# Patient Record
Sex: MALE | Race: WHITE | NOT HISPANIC OR LATINO | Employment: STUDENT | URBAN - METROPOLITAN AREA
[De-identification: names, ages, dates, MRNs, and addresses within clinical notes are randomized per-mention and may not be internally consistent; named-entity substitution may affect disease eponyms.]

---
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
## 2024-03-11 ENCOUNTER — OFFICE VISIT (OUTPATIENT)
Dept: FAMILY MEDICINE CLINIC | Facility: CLINIC | Age: 24
End: 2024-03-11
Payer: COMMERCIAL

## 2024-03-11 VITALS
OXYGEN SATURATION: 98 % | DIASTOLIC BLOOD PRESSURE: 70 MMHG | WEIGHT: 161 LBS | HEART RATE: 70 BPM | BODY MASS INDEX: 21.81 KG/M2 | TEMPERATURE: 97.7 F | SYSTOLIC BLOOD PRESSURE: 118 MMHG | HEIGHT: 72 IN

## 2024-03-11 DIAGNOSIS — R53.83 OTHER FATIGUE: ICD-10-CM

## 2024-03-11 DIAGNOSIS — Z00.00 ANNUAL PHYSICAL EXAM: Primary | ICD-10-CM

## 2024-03-11 DIAGNOSIS — K92.9 DIGESTIVE DISORDER: ICD-10-CM

## 2024-03-11 DIAGNOSIS — Z76.89 ENCOUNTER TO ESTABLISH CARE: ICD-10-CM

## 2024-03-11 PROCEDURE — 99385 PREV VISIT NEW AGE 18-39: CPT | Performed by: NURSE PRACTITIONER

## 2024-03-11 NOTE — PROGRESS NOTES
ADULT ANNUAL PHYSICAL  [facility] MEDICINE [facility]    NAME: Jeff Abebe  AGE: 23 y.o. SEX: male  : 2000     DATE: 3/11/2024     Assessment and Plan:     Problem List Items Addressed This Visit    None  Visit Diagnoses     Annual physical exam    -  Primary    Encounter to establish care        Other fatigue        Relevant Orders    Lipid panel    Vitamin B12    Vitamin D 25 hydroxy    Comprehensive metabolic panel    TSH, 3rd generation with Free T4 reflex    CBC and differential    Hemoglobin A1C    Digestive disorder        Relevant Orders    Celiac Disease Antibody Profile        Immunizations and preventive care screenings were discussed with patient today. Appropriate education was printed on patient's after visit summary.    Counseling:  Alcohol/drug use: discussed moderation in alcohol intake, the recommendations for healthy alcohol use, and avoidance of illicit drug use.  Dental Health: discussed importance of regular tooth brushing, flossing, and dental visits.  Exercise: the importance of regular exercise/physical activity was discussed. Recommend exercise 3-5 times per week for at least 30 minutes.     Depression Screening and Follow-up Plan: Patient was screened for depression during today's encounter. They screened negative with a PHQ-2 score of 0.      Return in about 1 year (around 3/11/2025) for Annual physical.or sooner if needed     Chief Complaint:     Chief Complaint   Patient presents with   • Establish Care   • Physical Exam     Feeling fatigue      History of Present Illness:     Adult Annual Physical   Patient here to establish care and due for a comprehensive physical exam. He reports that he has not been seen by a primary provider for a number of years. His girlfriend is the driving force to get him to establish today. The patient reports that he has been feeling extremely fatigued for awhile. He works swing shift so he understands  that might plan a part in his fatigue. He reports that he sleeps but still is tired throughout the day. His girlfriend does report he snores but no witnessed apnea episodes noted. He also reports having some bowel symptoms that sounds like a possible IBS-D. He does have a history of acid reflux but it is not bad to mange. He reports thinking he has some type of arrhythmia because at times he gets these feels like his heart skips a beat but girlfriend thinks it might have an anxiety component.       Diet and Physical Activity  Diet/Nutrition: well balanced diet, limited junk food, and consuming 3-5 servings of fruits/vegetables daily.   Exercise: moderate cardiovascular exercise and 5-7 times a week on average.      Depression Screening  PHQ-2/9 Depression Screening    Little interest or pleasure in doing things: 0 - not at all  Feeling down, depressed, or hopeless: 0 - not at all  PHQ-2 Score: 0  PHQ-2 Interpretation: Negative depression screen       General Health  Sleep: sleeps well and gets 7-8 hours of sleep on average.   Hearing: normal - bilateral.  Vision: no vision problems.   Dental: no dental visits for >1 year and brushes teeth twice daily.        Health  History of STDs?: no.    Advanced Care Planning  Do you have an advanced directive? no  Do you have a durable medical power of ? no  ACP document given to the patient? no     Review of Systems:     Review of Systems   Constitutional:  Positive for fatigue. Negative for chills, fever and unexpected weight change.   HENT: Negative.     Respiratory: Negative.     Cardiovascular:  Positive for palpitations (occasionally). Negative for chest pain and leg swelling.   Gastrointestinal:  Positive for diarrhea (occasionally). Negative for abdominal distention, abdominal pain, nausea and vomiting.   Musculoskeletal: Negative.    Skin: Negative.    Neurological: Negative.  Negative for dizziness, light-headedness and headaches.   Psychiatric/Behavioral:  Negative.        Past Medical History:     History reviewed. No pertinent past medical history.   Past Surgical History:     History reviewed. No pertinent surgical history.   Social History:     Social History     Socioeconomic History   • Marital status: Single     Spouse name: None   • Number of children: None   • Years of education: None   • Highest education level: None   Occupational History   • None   Tobacco Use   • Smoking status: Never     Passive exposure: Past   • Smokeless tobacco: Never   Vaping Use   • Vaping status: None   Substance and Sexual Activity   • Alcohol use: Never   • Drug use: Never   • Sexual activity: Yes   Other Topics Concern   • None   Social History Narrative   • None     Social Determinants of Health     Financial Resource Strain: Not on file   Food Insecurity: Not on file   Transportation Needs: Not on file   Physical Activity: Not on file   Stress: Not on file   Social Connections: Not on file   Intimate Partner Violence: Not on file   Housing Stability: Not on file      Family History:     Family History   Problem Relation Age of Onset   • Diabetes Father       Current Medications:     No current outpatient medications on file.     No current facility-administered medications for this visit.      Allergies:     Allergies   Allergen Reactions   • Penicillins Hives     swelling      Physical Exam:     /70 (BP Location: Left arm, Patient Position: Sitting, Cuff Size: Large)   Pulse 70   Temp 97.7 °F (36.5 °C)   Ht 6' (1.829 m)   Wt 73 kg (161 lb)   SpO2 98%   BMI 21.84 kg/m² (Reviewed)    Physical Exam  Vitals reviewed.   Constitutional:       General: He is not in acute distress.     Appearance: He is well-developed and well-groomed. He is not ill-appearing.   HENT:      Head: Normocephalic and atraumatic.      Right Ear: External ear normal.      Left Ear: External ear normal.      Nose: Nose normal.      Mouth/Throat:      Lips: Pink.      Mouth: Mucous membranes are  moist.      Dentition: Normal dentition.      Pharynx: Oropharynx is clear.   Eyes:      General: Lids are normal.      Extraocular Movements: Extraocular movements intact.      Conjunctiva/sclera: Conjunctivae normal.      Pupils: Pupils are equal, round, and reactive to light.   Neck:      Thyroid: No thyromegaly or thyroid tenderness.      Trachea: Trachea and phonation normal.   Cardiovascular:      Rate and Rhythm: Normal rate and regular rhythm.      Pulses:           Radial pulses are 2+ on the right side and 2+ on the left side.        Dorsalis pedis pulses are 2+ on the right side and 2+ on the left side.        Posterior tibial pulses are 2+ on the right side and 2+ on the left side.      Heart sounds: Normal heart sounds. No murmur heard.  Pulmonary:      Effort: Pulmonary effort is normal.      Breath sounds: Normal breath sounds.   Abdominal:      General: Abdomen is flat. Bowel sounds are normal. There is no distension.      Palpations: Abdomen is soft.      Tenderness: There is no abdominal tenderness.   Musculoskeletal:      Cervical back: Neck supple.      Right lower leg: No edema.      Left lower leg: No edema.   Skin:     General: Skin is warm and dry.      Capillary Refill: Capillary refill takes less than 2 seconds.      Nails: There is no clubbing.   Neurological:      General: No focal deficit present.      Mental Status: He is alert and oriented to person, place, and time.      Cranial Nerves: Cranial nerves 2-12 are intact.      Deep Tendon Reflexes: Reflexes are normal and symmetric.   Psychiatric:         Mood and Affect: Mood normal.         Speech: Speech normal.         Behavior: Behavior normal. Behavior is cooperative.          CAROL ANN Youssef   [facility]

## 2024-03-12 ENCOUNTER — APPOINTMENT (OUTPATIENT)
Dept: LAB | Facility: CLINIC | Age: 24
End: 2024-03-12
Payer: COMMERCIAL

## 2024-03-12 DIAGNOSIS — K92.9 DIGESTIVE DISORDER: ICD-10-CM

## 2024-03-12 DIAGNOSIS — R53.83 OTHER FATIGUE: ICD-10-CM

## 2024-03-12 LAB
25(OH)D3 SERPL-MCNC: 20.5 NG/ML (ref 30–100)
ALBUMIN SERPL BCP-MCNC: 4.9 G/DL (ref 3.5–5)
ALP SERPL-CCNC: 46 U/L (ref 34–104)
ALT SERPL W P-5'-P-CCNC: 44 U/L (ref 7–52)
ANION GAP SERPL CALCULATED.3IONS-SCNC: 9 MMOL/L (ref 4–13)
AST SERPL W P-5'-P-CCNC: 29 U/L (ref 13–39)
BASOPHILS # BLD AUTO: 0.05 THOUSANDS/ÂΜL (ref 0–0.1)
BASOPHILS NFR BLD AUTO: 1 % (ref 0–1)
BILIRUB SERPL-MCNC: 0.82 MG/DL (ref 0.2–1)
BUN SERPL-MCNC: 15 MG/DL (ref 5–25)
CALCIUM SERPL-MCNC: 9.7 MG/DL (ref 8.4–10.2)
CHLORIDE SERPL-SCNC: 102 MMOL/L (ref 96–108)
CHOLEST SERPL-MCNC: 154 MG/DL
CO2 SERPL-SCNC: 30 MMOL/L (ref 21–32)
CREAT SERPL-MCNC: 1.21 MG/DL (ref 0.6–1.3)
EOSINOPHIL # BLD AUTO: 0.12 THOUSAND/ÂΜL (ref 0–0.61)
EOSINOPHIL NFR BLD AUTO: 2 % (ref 0–6)
ERYTHROCYTE [DISTWIDTH] IN BLOOD BY AUTOMATED COUNT: 12.1 % (ref 11.6–15.1)
EST. AVERAGE GLUCOSE BLD GHB EST-MCNC: 108 MG/DL
GFR SERPL CREATININE-BSD FRML MDRD: 83 ML/MIN/1.73SQ M
GLUCOSE P FAST SERPL-MCNC: 97 MG/DL (ref 65–99)
HBA1C MFR BLD: 5.4 %
HCT VFR BLD AUTO: 51.9 % (ref 36.5–49.3)
HDLC SERPL-MCNC: 49 MG/DL
HGB BLD-MCNC: 17.3 G/DL (ref 12–17)
IMM GRANULOCYTES # BLD AUTO: 0.02 THOUSAND/UL (ref 0–0.2)
IMM GRANULOCYTES NFR BLD AUTO: 0 % (ref 0–2)
LDLC SERPL CALC-MCNC: 84 MG/DL (ref 0–100)
LYMPHOCYTES # BLD AUTO: 1.93 THOUSANDS/ÂΜL (ref 0.6–4.47)
LYMPHOCYTES NFR BLD AUTO: 30 % (ref 14–44)
MCH RBC QN AUTO: 29.2 PG (ref 26.8–34.3)
MCHC RBC AUTO-ENTMCNC: 33.3 G/DL (ref 31.4–37.4)
MCV RBC AUTO: 88 FL (ref 82–98)
MONOCYTES # BLD AUTO: 0.56 THOUSAND/ÂΜL (ref 0.17–1.22)
MONOCYTES NFR BLD AUTO: 9 % (ref 4–12)
NEUTROPHILS # BLD AUTO: 3.85 THOUSANDS/ÂΜL (ref 1.85–7.62)
NEUTS SEG NFR BLD AUTO: 58 % (ref 43–75)
NONHDLC SERPL-MCNC: 105 MG/DL
NRBC BLD AUTO-RTO: 0 /100 WBCS
PLATELET # BLD AUTO: 226 THOUSANDS/UL (ref 149–390)
PMV BLD AUTO: 11.5 FL (ref 8.9–12.7)
POTASSIUM SERPL-SCNC: 4.2 MMOL/L (ref 3.5–5.3)
PROT SERPL-MCNC: 7.2 G/DL (ref 6.4–8.4)
RBC # BLD AUTO: 5.92 MILLION/UL (ref 3.88–5.62)
SODIUM SERPL-SCNC: 141 MMOL/L (ref 135–147)
TRIGL SERPL-MCNC: 107 MG/DL
TSH SERPL DL<=0.05 MIU/L-ACNC: 1.09 UIU/ML (ref 0.45–4.5)
VIT B12 SERPL-MCNC: 475 PG/ML (ref 180–914)
WBC # BLD AUTO: 6.53 THOUSAND/UL (ref 4.31–10.16)

## 2024-03-12 PROCEDURE — 80061 LIPID PANEL: CPT

## 2024-03-12 PROCEDURE — 80053 COMPREHEN METABOLIC PANEL: CPT

## 2024-03-12 PROCEDURE — 84443 ASSAY THYROID STIM HORMONE: CPT

## 2024-03-12 PROCEDURE — 82607 VITAMIN B-12: CPT

## 2024-03-12 PROCEDURE — 82306 VITAMIN D 25 HYDROXY: CPT

## 2024-03-12 PROCEDURE — 83036 HEMOGLOBIN GLYCOSYLATED A1C: CPT

## 2024-03-12 PROCEDURE — 86364 TISS TRNSGLTMNASE EA IG CLAS: CPT

## 2024-03-12 PROCEDURE — 86258 DGP ANTIBODY EACH IG CLASS: CPT

## 2024-03-12 PROCEDURE — 82784 ASSAY IGA/IGD/IGG/IGM EACH: CPT

## 2024-03-12 PROCEDURE — 36415 COLL VENOUS BLD VENIPUNCTURE: CPT

## 2024-03-12 PROCEDURE — 86231 EMA EACH IG CLASS: CPT

## 2024-03-12 PROCEDURE — 85025 COMPLETE CBC W/AUTO DIFF WBC: CPT

## 2024-03-14 LAB
ENDOMYSIUM IGA SER QL: NEGATIVE
GLIADIN PEPTIDE IGA SER-ACNC: 4 UNITS (ref 0–19)
GLIADIN PEPTIDE IGG SER-ACNC: 3 UNITS (ref 0–19)
IGA SERPL-MCNC: 165 MG/DL (ref 90–386)
TTG IGA SER-ACNC: <2 U/ML (ref 0–3)
TTG IGG SER-ACNC: <2 U/ML (ref 0–5)

## 2024-05-06 DIAGNOSIS — K21.9 CHRONIC GERD: Primary | ICD-10-CM

## 2024-09-17 ENCOUNTER — CONSULT (OUTPATIENT)
Dept: GASTROENTEROLOGY | Facility: AMBULARY SURGERY CENTER | Age: 24
End: 2024-09-17
Payer: COMMERCIAL

## 2024-09-17 ENCOUNTER — PREP FOR PROCEDURE (OUTPATIENT)
Dept: GASTROENTEROLOGY | Facility: AMBULARY SURGERY CENTER | Age: 24
End: 2024-09-17

## 2024-09-17 VITALS
WEIGHT: 166 LBS | SYSTOLIC BLOOD PRESSURE: 118 MMHG | DIASTOLIC BLOOD PRESSURE: 84 MMHG | BODY MASS INDEX: 22.48 KG/M2 | HEIGHT: 72 IN | OXYGEN SATURATION: 99 % | HEART RATE: 71 BPM

## 2024-09-17 DIAGNOSIS — K21.9 CHRONIC GERD: ICD-10-CM

## 2024-09-17 DIAGNOSIS — R19.7 DIARRHEA, UNSPECIFIED TYPE: ICD-10-CM

## 2024-09-17 DIAGNOSIS — K21.9 GASTROESOPHAGEAL REFLUX DISEASE WITHOUT ESOPHAGITIS: Primary | ICD-10-CM

## 2024-09-17 DIAGNOSIS — R10.30 LOWER ABDOMINAL PAIN: ICD-10-CM

## 2024-09-17 PROCEDURE — 99204 OFFICE O/P NEW MOD 45 MIN: CPT | Performed by: INTERNAL MEDICINE

## 2024-09-17 RX ORDER — DICYCLOMINE HCL 20 MG
20 TABLET ORAL 3 TIMES DAILY PRN
Qty: 30 TABLET | Refills: 1 | Status: SHIPPED | OUTPATIENT
Start: 2024-09-17

## 2024-09-17 RX ORDER — SODIUM PICOSULFATE, MAGNESIUM OXIDE, AND ANHYDROUS CITRIC ACID 12; 3.5; 1 G/175ML; G/175ML; MG/175ML
LIQUID ORAL
Qty: 350 ML | Refills: 0 | Status: SHIPPED | OUTPATIENT
Start: 2024-09-17

## 2024-09-17 NOTE — ASSESSMENT & PLAN NOTE
Patient complaining about acid reflux symptoms and noticed some improvement on Prilosec.    -Patient was explained about the lifestyle and dietary modifications.  Advised to avoid fatty foods, chocolates, caffeine, alcohol and any other triggering foods.  Avoid eating for at least 3 hours before going to bed.    -May take Pepcid or Tums as needed    -Schedule EGD along with colonoscopy

## 2024-09-17 NOTE — ASSESSMENT & PLAN NOTE
Postprandial diarrhea associated with abdominal pain appears to be most likely functional secondary to IBS.  Probable lactose intolerance.  Doubt for IBD    -Explained to patient in detail about different possible etiologies and given reassurance.    -Discussed about the pathophysiology and the treatment plan for IBS    -Schedule for colonoscopy.    -Patient was given instructions about the colonoscopy prep.    -Patient was explained about the risks and benefits of the procedure. Risks including but not limited to bleeding, infection, perforation were explained in detail. Also explained about less than 100% sensitivity with the exam and other alternatives.

## 2024-09-17 NOTE — PROGRESS NOTES
Consultation -  [facility]  Jeffgoldie Gibsonold 2000 male         ASSESSMENT & PLAN:    Diarrhea  Postprandial diarrhea associated with abdominal pain appears to be most likely functional secondary to IBS.  Probable lactose intolerance.  Doubt for IBD    -Explained to patient in detail about different possible etiologies and given reassurance.    -Discussed about the pathophysiology and the treatment plan for IBS    -Schedule for colonoscopy.    -Patient was given instructions about the colonoscopy prep.    -Patient was explained about the risks and benefits of the procedure. Risks including but not limited to bleeding, infection, perforation were explained in detail. Also explained about less than 100% sensitivity with the exam and other alternatives.    Lower abdominal pain  Appears to be small bowel spasms    -Treatment plan as described above    -Will try Bentyl as needed    Gastroesophageal reflux disease without esophagitis  Patient complaining about acid reflux symptoms and noticed some improvement on Prilosec.    -Patient was explained about the lifestyle and dietary modifications.  Advised to avoid fatty foods, chocolates, caffeine, alcohol and any other triggering foods.  Avoid eating for at least 3 hours before going to bed.    -May take Pepcid or Tums as needed    -Schedule EGD along with colonoscopy      Chief Complaint: Abdominal pain, diarrhea, GERD    HPI: 24-year-old male with no significant past medical history reports having chronic stomach issues.  Describes as pain after eating associated with loose bowel movements.  Denies any blood Demix in the stool.  Good appetite, no recent weight loss.  Occasional acid reflux and he recently took 2-week course of Prilosec with help.  Denies any difficulty swallowing.  He was tested negative for celiac disease    Chaperon: Ms. Navarro    REVIEW OF SYSTEMS: Review of Systems   Constitutional:  Negative for activity change, appetite change,  chills, diaphoresis, fatigue, fever and unexpected weight change.   HENT:  Negative for ear discharge, ear pain, facial swelling, hearing loss, nosebleeds, sore throat, tinnitus and voice change.    Eyes:  Negative for pain, discharge, redness, itching and visual disturbance.   Respiratory:  Negative for apnea, cough, chest tightness, shortness of breath and wheezing.    Cardiovascular:  Negative for chest pain and palpitations.   Gastrointestinal:         As noted in HPI   Endocrine: Negative for cold intolerance, heat intolerance and polyuria.   Genitourinary:  Negative for difficulty urinating, dysuria, flank pain, hematuria and urgency.   Musculoskeletal:  Negative for arthralgias, back pain, gait problem, joint swelling and myalgias.   Skin:  Negative for rash and wound.   Neurological:  Negative for dizziness, tremors, seizures, speech difficulty, light-headedness, numbness and headaches.   Hematological:  Negative for adenopathy. Does not bruise/bleed easily.   Psychiatric/Behavioral:  Negative for agitation, behavioral problems and confusion. The patient is not nervous/anxious.         History reviewed. No pertinent past medical history.   Past Surgical History:   Procedure Laterality Date    SHOULDER SURGERY Left 04/14/2024     Social History     Socioeconomic History    Marital status: Single     Spouse name: Not on file    Number of children: Not on file    Years of education: Not on file    Highest education level: Not on file   Occupational History    Not on file   Tobacco Use    Smoking status: Never     Passive exposure: Past    Smokeless tobacco: Never   Vaping Use    Vaping status: Not on file   Substance and Sexual Activity    Alcohol use: Never    Drug use: Never    Sexual activity: Yes   Other Topics Concern    Not on file   Social History Narrative    Not on file     Social Determinants of Health     Financial Resource Strain: Not on file   Food Insecurity: Not on file   Transportation Needs:  Not on file   Physical Activity: Not on file   Stress: Not on file   Social Connections: Not on file   Intimate Partner Violence: Not on file   Housing Stability: Not on file     Family History   Problem Relation Age of Onset    Diabetes Father      Penicillins  Current Outpatient Medications   Medication Sig Dispense Refill    dicyclomine (BENTYL) 20 mg tablet Take 1 tablet (20 mg total) by mouth 3 (three) times a day as needed (Abdominal pain) 30 tablet 1    sodium picosulfate, magnesium oxide, citric acid (Clenpiq) oral solution Take 175 mL (1 bottle) the evening before the colonoscopy, between 5 PM and 9 PM, followed by a second 175 mL bottle 5 hours before the colonoscopy. 350 mL 0     No current facility-administered medications for this visit.       Blood pressure 118/84, pulse 71, height 6' (1.829 m), weight 75.3 kg (166 lb), SpO2 99%.    PHYSICAL EXAM: Physical Exam  Constitutional:       Appearance: He is well-developed.   HENT:      Head: Normocephalic and atraumatic.   Eyes:      General: No scleral icterus.        Right eye: No discharge.         Left eye: No discharge.      Conjunctiva/sclera: Conjunctivae normal.      Pupils: Pupils are equal, round, and reactive to light.   Neck:      Thyroid: No thyromegaly.      Vascular: No JVD.      Trachea: No tracheal deviation.   Cardiovascular:      Rate and Rhythm: Normal rate and regular rhythm.      Heart sounds: Normal heart sounds. No murmur heard.     No friction rub. No gallop.   Pulmonary:      Effort: Pulmonary effort is normal. No respiratory distress.      Breath sounds: Normal breath sounds. No wheezing or rales.   Chest:      Chest wall: No tenderness.   Abdominal:      General: Bowel sounds are normal. There is no distension.      Palpations: Abdomen is soft. There is no mass.      Tenderness: There is no abdominal tenderness. There is no guarding or rebound.      Hernia: No hernia is present.   Musculoskeletal:      Cervical back: Neck supple.  "  Lymphadenopathy:      Cervical: No cervical adenopathy.   Skin:     General: Skin is warm and dry.      Findings: No erythema or rash.   Neurological:      Mental Status: He is alert and oriented to person, place, and time.   Psychiatric:         Behavior: Behavior normal.         Thought Content: Thought content normal.          Lab Results   Component Value Date    WBC 6.53 03/12/2024    HGB 17.3 (H) 03/12/2024    HCT 51.9 (H) 03/12/2024    MCV 88 03/12/2024     03/12/2024     Lab Results   Component Value Date    CALCIUM 9.7 03/12/2024    K 4.2 03/12/2024    CO2 30 03/12/2024     03/12/2024    BUN 15 03/12/2024    CREATININE 1.21 03/12/2024     Lab Results   Component Value Date    ALT 44 03/12/2024    AST 29 03/12/2024    ALKPHOS 46 03/12/2024     No results found for: \"INR\", \"PROTIME\"    Patient was never admitted.        "

## 2024-09-17 NOTE — ASSESSMENT & PLAN NOTE
Appears to be small bowel spasms    -Treatment plan as described above    -Will try Bentyl as needed

## 2024-10-20 ENCOUNTER — HOSPITAL ENCOUNTER (OUTPATIENT)
Facility: HOSPITAL | Age: 24
Discharge: HOME | End: 2024-10-21
Attending: EMERGENCY MEDICINE | Admitting: STUDENT IN AN ORGANIZED HEALTH CARE EDUCATION/TRAINING PROGRAM
Payer: COMMERCIAL

## 2024-10-20 ENCOUNTER — APPOINTMENT (EMERGENCY)
Dept: CT IMAGING | Facility: HOSPITAL | Age: 24
End: 2024-10-20
Payer: COMMERCIAL

## 2024-10-20 ENCOUNTER — ANESTHESIA EVENT (EMERGENCY)
Dept: PERIOP | Facility: HOSPITAL | Age: 24
End: 2024-10-20
Payer: COMMERCIAL

## 2024-10-20 ENCOUNTER — ANESTHESIA (EMERGENCY)
Dept: PERIOP | Facility: HOSPITAL | Age: 24
End: 2024-10-20
Payer: COMMERCIAL

## 2024-10-20 DIAGNOSIS — K35.30 ACUTE APPENDICITIS WITH LOCALIZED PERITONITIS, WITHOUT PERFORATION, ABSCESS, OR GANGRENE: Primary | ICD-10-CM

## 2024-10-20 LAB
ANION GAP SERPL CALCULATED.3IONS-SCNC: 11 MMOL/L (ref 4–13)
BASOPHILS # BLD AUTO: 0.04 THOUSANDS/ΜL (ref 0–0.1)
BASOPHILS NFR BLD AUTO: 0 % (ref 0–1)
BILIRUB UR QL STRIP: NEGATIVE
BUN SERPL-MCNC: 12 MG/DL (ref 5–25)
CALCIUM SERPL-MCNC: 9.9 MG/DL (ref 8.4–10.2)
CHLORIDE SERPL-SCNC: 101 MMOL/L (ref 96–108)
CLARITY UR: CLEAR
CO2 SERPL-SCNC: 24 MMOL/L (ref 21–32)
COLOR UR: ABNORMAL
CREAT SERPL-MCNC: 1.18 MG/DL (ref 0.6–1.3)
EOSINOPHIL # BLD AUTO: 0.14 THOUSAND/ΜL (ref 0–0.61)
EOSINOPHIL NFR BLD AUTO: 1 % (ref 0–6)
ERYTHROCYTE [DISTWIDTH] IN BLOOD BY AUTOMATED COUNT: 11.9 % (ref 11.6–15.1)
GLUCOSE SERPL-MCNC: 104 MG/DL (ref 65–140)
GLUCOSE UR STRIP-MCNC: NEGATIVE MG/DL
HCT VFR BLD AUTO: 50.3 % (ref 36.5–46.1)
HGB BLD-MCNC: 17.6 G/DL (ref 12–15.4)
HGB UR QL STRIP.AUTO: NEGATIVE
IMM GRANULOCYTES # BLD AUTO: 0.04 THOUSAND/UL (ref 0–0.2)
IMM GRANULOCYTES NFR BLD AUTO: 0 % (ref 0–2)
KETONES UR STRIP-MCNC: ABNORMAL MG/DL
LEUKOCYTE ESTERASE UR QL STRIP: NEGATIVE
LYMPHOCYTES # BLD AUTO: 1.06 THOUSANDS/ΜL (ref 0.6–4.47)
LYMPHOCYTES NFR BLD AUTO: 8 % (ref 14–44)
MCH RBC QN AUTO: 29.1 PG (ref 26.8–34.3)
MCHC RBC AUTO-ENTMCNC: 35 G/DL (ref 31.4–37.4)
MCV RBC AUTO: 83 FL (ref 82–98)
MONOCYTES # BLD AUTO: 1.03 THOUSAND/ΜL (ref 0.17–1.22)
MONOCYTES NFR BLD AUTO: 8 % (ref 4–12)
NEUTROPHILS # BLD AUTO: 10.56 THOUSANDS/ΜL (ref 1.85–7.62)
NEUTS SEG NFR BLD AUTO: 83 % (ref 43–75)
NITRITE UR QL STRIP: NEGATIVE
NRBC BLD AUTO-RTO: 0 /100 WBCS
PH UR STRIP.AUTO: 7 [PH]
PLATELET # BLD AUTO: 235 THOUSANDS/UL (ref 149–390)
PMV BLD AUTO: 10.9 FL (ref 8.9–12.7)
POTASSIUM SERPL-SCNC: 3.4 MMOL/L (ref 3.5–5.3)
PROT UR STRIP-MCNC: NEGATIVE MG/DL
RBC # BLD AUTO: 6.05 MILLION/UL (ref 3.88–5.12)
SODIUM SERPL-SCNC: 136 MMOL/L (ref 135–147)
SP GR UR STRIP.AUTO: 1.02 (ref 1–1.03)
UROBILINOGEN UR STRIP-ACNC: <2 MG/DL
WBC # BLD AUTO: 12.87 THOUSAND/UL (ref 4.31–10.16)

## 2024-10-20 PROCEDURE — 80048 BASIC METABOLIC PNL TOTAL CA: CPT | Performed by: EMERGENCY MEDICINE

## 2024-10-20 PROCEDURE — 74177 CT ABD & PELVIS W/CONTRAST: CPT

## 2024-10-20 PROCEDURE — 96375 TX/PRO/DX INJ NEW DRUG ADDON: CPT

## 2024-10-20 PROCEDURE — 96366 THER/PROPH/DIAG IV INF ADDON: CPT

## 2024-10-20 PROCEDURE — 44970 LAPAROSCOPY APPENDECTOMY: CPT | Performed by: STUDENT IN AN ORGANIZED HEALTH CARE EDUCATION/TRAINING PROGRAM

## 2024-10-20 PROCEDURE — 99284 EMERGENCY DEPT VISIT MOD MDM: CPT

## 2024-10-20 PROCEDURE — 85025 COMPLETE CBC W/AUTO DIFF WBC: CPT | Performed by: EMERGENCY MEDICINE

## 2024-10-20 PROCEDURE — 81003 URINALYSIS AUTO W/O SCOPE: CPT | Performed by: EMERGENCY MEDICINE

## 2024-10-20 PROCEDURE — 99285 EMERGENCY DEPT VISIT HI MDM: CPT | Performed by: EMERGENCY MEDICINE

## 2024-10-20 PROCEDURE — 99205 OFFICE O/P NEW HI 60 MIN: CPT | Performed by: STUDENT IN AN ORGANIZED HEALTH CARE EDUCATION/TRAINING PROGRAM

## 2024-10-20 PROCEDURE — 88304 TISSUE EXAM BY PATHOLOGIST: CPT | Performed by: SPECIALIST

## 2024-10-20 PROCEDURE — 96365 THER/PROPH/DIAG IV INF INIT: CPT

## 2024-10-20 PROCEDURE — 36415 COLL VENOUS BLD VENIPUNCTURE: CPT | Performed by: EMERGENCY MEDICINE

## 2024-10-20 RX ORDER — MAGNESIUM HYDROXIDE 1200 MG/15ML
LIQUID ORAL AS NEEDED
Status: DISCONTINUED | OUTPATIENT
Start: 2024-10-20 | End: 2024-10-20 | Stop reason: HOSPADM

## 2024-10-20 RX ORDER — ROCURONIUM BROMIDE 10 MG/ML
INJECTION, SOLUTION INTRAVENOUS AS NEEDED
Status: DISCONTINUED | OUTPATIENT
Start: 2024-10-20 | End: 2024-10-20

## 2024-10-20 RX ORDER — ONDANSETRON 2 MG/ML
INJECTION INTRAMUSCULAR; INTRAVENOUS AS NEEDED
Status: DISCONTINUED | OUTPATIENT
Start: 2024-10-20 | End: 2024-10-20

## 2024-10-20 RX ORDER — ACETAMINOPHEN 325 MG/1
975 TABLET ORAL EVERY 6 HOURS
Status: DISCONTINUED | OUTPATIENT
Start: 2024-10-20 | End: 2024-10-21 | Stop reason: HOSPADM

## 2024-10-20 RX ORDER — GLYCOPYRROLATE 0.2 MG/ML
INJECTION INTRAMUSCULAR; INTRAVENOUS AS NEEDED
Status: DISCONTINUED | OUTPATIENT
Start: 2024-10-20 | End: 2024-10-20

## 2024-10-20 RX ORDER — MIDAZOLAM HYDROCHLORIDE 2 MG/2ML
INJECTION, SOLUTION INTRAMUSCULAR; INTRAVENOUS AS NEEDED
Status: DISCONTINUED | OUTPATIENT
Start: 2024-10-20 | End: 2024-10-20

## 2024-10-20 RX ORDER — METRONIDAZOLE 500 MG/100ML
INJECTION, SOLUTION INTRAVENOUS CONTINUOUS PRN
Status: DISCONTINUED | OUTPATIENT
Start: 2024-10-20 | End: 2024-10-20

## 2024-10-20 RX ORDER — HYDROMORPHONE HCL IN WATER/PF 6 MG/30 ML
0.2 PATIENT CONTROLLED ANALGESIA SYRINGE INTRAVENOUS
Status: DISCONTINUED | OUTPATIENT
Start: 2024-10-20 | End: 2024-10-21 | Stop reason: HOSPADM

## 2024-10-20 RX ORDER — SODIUM CHLORIDE, SODIUM LACTATE, POTASSIUM CHLORIDE, CALCIUM CHLORIDE 600; 310; 30; 20 MG/100ML; MG/100ML; MG/100ML; MG/100ML
75 INJECTION, SOLUTION INTRAVENOUS CONTINUOUS
Status: DISCONTINUED | OUTPATIENT
Start: 2024-10-20 | End: 2024-10-21

## 2024-10-20 RX ORDER — OXYCODONE HYDROCHLORIDE 5 MG/1
5 TABLET ORAL EVERY 4 HOURS PRN
Status: DISCONTINUED | OUTPATIENT
Start: 2024-10-20 | End: 2024-10-21 | Stop reason: HOSPADM

## 2024-10-20 RX ORDER — HYDROMORPHONE HCL/PF 1 MG/ML
0.25 SYRINGE (ML) INJECTION
Status: DISCONTINUED | OUTPATIENT
Start: 2024-10-20 | End: 2024-10-20 | Stop reason: HOSPADM

## 2024-10-20 RX ORDER — FENTANYL CITRATE/PF 50 MCG/ML
25 SYRINGE (ML) INJECTION
Status: DISCONTINUED | OUTPATIENT
Start: 2024-10-20 | End: 2024-10-20 | Stop reason: HOSPADM

## 2024-10-20 RX ORDER — LIDOCAINE HYDROCHLORIDE 10 MG/ML
INJECTION, SOLUTION EPIDURAL; INFILTRATION; INTRACAUDAL; PERINEURAL AS NEEDED
Status: DISCONTINUED | OUTPATIENT
Start: 2024-10-20 | End: 2024-10-20 | Stop reason: HOSPADM

## 2024-10-20 RX ORDER — SODIUM CHLORIDE, SODIUM LACTATE, POTASSIUM CHLORIDE, CALCIUM CHLORIDE 600; 310; 30; 20 MG/100ML; MG/100ML; MG/100ML; MG/100ML
INJECTION, SOLUTION INTRAVENOUS CONTINUOUS PRN
Status: DISCONTINUED | OUTPATIENT
Start: 2024-10-20 | End: 2024-10-20

## 2024-10-20 RX ORDER — ONDANSETRON 2 MG/ML
4 INJECTION INTRAMUSCULAR; INTRAVENOUS EVERY 4 HOURS PRN
Status: DISCONTINUED | OUTPATIENT
Start: 2024-10-20 | End: 2024-10-21 | Stop reason: HOSPADM

## 2024-10-20 RX ORDER — CEFAZOLIN SODIUM 2 G/50ML
2000 SOLUTION INTRAVENOUS EVERY 8 HOURS
Status: DISCONTINUED | OUTPATIENT
Start: 2024-10-20 | End: 2024-10-21 | Stop reason: HOSPADM

## 2024-10-20 RX ORDER — ONDANSETRON 2 MG/ML
4 INJECTION INTRAMUSCULAR; INTRAVENOUS ONCE AS NEEDED
Status: DISCONTINUED | OUTPATIENT
Start: 2024-10-20 | End: 2024-10-20 | Stop reason: HOSPADM

## 2024-10-20 RX ORDER — PROMETHAZINE HYDROCHLORIDE 25 MG/ML
12.5 INJECTION, SOLUTION INTRAMUSCULAR; INTRAVENOUS ONCE AS NEEDED
Status: DISCONTINUED | OUTPATIENT
Start: 2024-10-20 | End: 2024-10-20 | Stop reason: HOSPADM

## 2024-10-20 RX ORDER — ACETAMINOPHEN 10 MG/ML
1000 INJECTION, SOLUTION INTRAVENOUS EVERY 6 HOURS SCHEDULED
Status: DISCONTINUED | OUTPATIENT
Start: 2024-10-20 | End: 2024-10-20

## 2024-10-20 RX ORDER — PROPOFOL 10 MG/ML
INJECTION, EMULSION INTRAVENOUS CONTINUOUS PRN
Status: DISCONTINUED | OUTPATIENT
Start: 2024-10-20 | End: 2024-10-20

## 2024-10-20 RX ORDER — PROPOFOL 10 MG/ML
INJECTION, EMULSION INTRAVENOUS AS NEEDED
Status: DISCONTINUED | OUTPATIENT
Start: 2024-10-20 | End: 2024-10-20

## 2024-10-20 RX ORDER — FENTANYL CITRATE 50 UG/ML
INJECTION, SOLUTION INTRAMUSCULAR; INTRAVENOUS AS NEEDED
Status: DISCONTINUED | OUTPATIENT
Start: 2024-10-20 | End: 2024-10-20

## 2024-10-20 RX ORDER — MEPERIDINE HYDROCHLORIDE 25 MG/ML
12.5 INJECTION INTRAMUSCULAR; INTRAVENOUS; SUBCUTANEOUS
Status: DISCONTINUED | OUTPATIENT
Start: 2024-10-20 | End: 2024-10-20 | Stop reason: HOSPADM

## 2024-10-20 RX ORDER — LIDOCAINE HYDROCHLORIDE 10 MG/ML
INJECTION, SOLUTION EPIDURAL; INFILTRATION; INTRACAUDAL; PERINEURAL AS NEEDED
Status: DISCONTINUED | OUTPATIENT
Start: 2024-10-20 | End: 2024-10-20

## 2024-10-20 RX ORDER — HYDROMORPHONE HCL/PF 1 MG/ML
0.5 SYRINGE (ML) INJECTION ONCE
Status: COMPLETED | OUTPATIENT
Start: 2024-10-20 | End: 2024-10-20

## 2024-10-20 RX ORDER — HEPARIN SODIUM 5000 [USP'U]/ML
5000 INJECTION, SOLUTION INTRAVENOUS; SUBCUTANEOUS EVERY 8 HOURS SCHEDULED
Status: DISCONTINUED | OUTPATIENT
Start: 2024-10-20 | End: 2024-10-20

## 2024-10-20 RX ORDER — METRONIDAZOLE 500 MG/100ML
500 INJECTION, SOLUTION INTRAVENOUS EVERY 8 HOURS
Status: DISCONTINUED | OUTPATIENT
Start: 2024-10-20 | End: 2024-10-21 | Stop reason: HOSPADM

## 2024-10-20 RX ADMIN — FENTANYL CITRATE 100 MCG: 50 INJECTION INTRAMUSCULAR; INTRAVENOUS at 16:05

## 2024-10-20 RX ADMIN — METRONIDAZOLE: 500 INJECTION, SOLUTION INTRAVENOUS at 16:11

## 2024-10-20 RX ADMIN — HYDROMORPHONE HYDROCHLORIDE 0.5 MG: 1 INJECTION, SOLUTION INTRAMUSCULAR; INTRAVENOUS; SUBCUTANEOUS at 13:04

## 2024-10-20 RX ADMIN — CEFAZOLIN SODIUM 2000 MG: 2 SOLUTION INTRAVENOUS at 15:56

## 2024-10-20 RX ADMIN — MIDAZOLAM 2 MG: 1 INJECTION INTRAMUSCULAR; INTRAVENOUS at 16:02

## 2024-10-20 RX ADMIN — PROPOFOL 50 MG: 10 INJECTION, EMULSION INTRAVENOUS at 17:19

## 2024-10-20 RX ADMIN — HYDROMORPHONE HYDROCHLORIDE 0.2 MG: 0.2 INJECTION, SOLUTION INTRAMUSCULAR; INTRAVENOUS; SUBCUTANEOUS at 15:37

## 2024-10-20 RX ADMIN — ROCURONIUM BROMIDE 5 MG: 10 INJECTION INTRAVENOUS at 16:05

## 2024-10-20 RX ADMIN — PROPOFOL 200 MG: 10 INJECTION, EMULSION INTRAVENOUS at 16:05

## 2024-10-20 RX ADMIN — SODIUM CHLORIDE, SODIUM LACTATE, POTASSIUM CHLORIDE, AND CALCIUM CHLORIDE: .6; .31; .03; .02 INJECTION, SOLUTION INTRAVENOUS at 16:02

## 2024-10-20 RX ADMIN — ACETAMINOPHEN 975 MG: 325 TABLET ORAL at 19:02

## 2024-10-20 RX ADMIN — SODIUM CHLORIDE, SODIUM LACTATE, POTASSIUM CHLORIDE, AND CALCIUM CHLORIDE 75 ML/HR: .6; .31; .03; .02 INJECTION, SOLUTION INTRAVENOUS at 19:23

## 2024-10-20 RX ADMIN — IOHEXOL 75 ML: 350 INJECTION, SOLUTION INTRAVENOUS at 13:14

## 2024-10-20 RX ADMIN — LIDOCAINE HYDROCHLORIDE 50 MG: 10 INJECTION, SOLUTION EPIDURAL; INFILTRATION; INTRACAUDAL at 16:05

## 2024-10-20 RX ADMIN — PROPOFOL 150 MCG/KG/MIN: 10 INJECTION, EMULSION INTRAVENOUS at 16:33

## 2024-10-20 RX ADMIN — ACETAMINOPHEN 1000 MG: 10 INJECTION INTRAVENOUS at 17:45

## 2024-10-20 RX ADMIN — METRONIDAZOLE 500 MG: 500 INJECTION, SOLUTION INTRAVENOUS at 22:36

## 2024-10-20 RX ADMIN — SUGAMMADEX 400 MG: 100 INJECTION, SOLUTION INTRAVENOUS at 17:19

## 2024-10-20 RX ADMIN — OXYCODONE HYDROCHLORIDE 5 MG: 5 TABLET ORAL at 20:50

## 2024-10-20 RX ADMIN — ONDANSETRON 4 MG: 2 INJECTION INTRAMUSCULAR; INTRAVENOUS at 16:02

## 2024-10-20 RX ADMIN — ONDANSETRON 4 MG: 2 INJECTION INTRAMUSCULAR; INTRAVENOUS at 20:51

## 2024-10-20 RX ADMIN — GLYCOPYRROLATE 0.2 MG: 0.2 INJECTION INTRAMUSCULAR; INTRAVENOUS at 16:02

## 2024-10-20 RX ADMIN — HYDROMORPHONE HYDROCHLORIDE 0.2 MG: 0.2 INJECTION, SOLUTION INTRAMUSCULAR; INTRAVENOUS; SUBCUTANEOUS at 22:40

## 2024-10-20 RX ADMIN — SODIUM CHLORIDE, SODIUM LACTATE, POTASSIUM CHLORIDE, AND CALCIUM CHLORIDE 500 ML: .6; .31; .03; .02 INJECTION, SOLUTION INTRAVENOUS at 13:04

## 2024-10-20 NOTE — OP NOTE
OPERATIVE REPORT  PATIENT NAME: Jeff Abebe    :  2000  MRN: [id number]  Pt Location: AN OR ROOM 03    SURGERY DATE: 10/20/2024    Surgeons and Role:     * Lauryn Ullrich, DO - Primary     * Janis Joaqiun MD - Assisting     * Ivan Ann MD - Assisting    Preop Diagnosis:  Acute appendicitis with localized peritonitis, without perforation, abscess, or gangrene [K35.30]    Post-Op Diagnosis Codes:     * Acute appendicitis with localized peritonitis, without perforation, abscess, or gangrene [K35.30]    Procedure(s):  APPENDECTOMY LAPAROSCOPIC    Specimen(s):  ID Type Source Tests Collected by Time Destination   1 :  Tissue Appendix TISSUE EXAM Lauryn Ullrich, DO 10/20/2024 1648        Estimated Blood Loss:   Minimal    Drains: none  [REMOVED] Urethral Catheter Latex 16 Fr. (Removed)   Number of days: 0       Anesthesia Type:   General    Operative Indications:  Acute appendicitis with localized peritonitis, without perforation, abscess, or gangrene [K35.30]      Operative Findings:  Hyperemic inflamed appendix consistent with acute appendicitis      Complications:   None    Procedure and Technique:  The patient was brought to the operating room and identified verbally and via wristband. He was transferred to the operating room table and positioned supine. General endotracheal anesthesia was induced successfully and a Young catheter was placed. The patient's abdomen was prepped and draped in the usual sterile fashion. Pre-operative antibiotics were administered. A time out was performed confirming the patient, procedure, and site.  All parties were in agreement.    An infraumbilical vertical incision was made.  The fascia was elevated and a Veress needle was inserted.  Proper position was confirmed by aspiration and saline meniscus test.  A 5 mm trocar was inserted under Optiview.  The abdomen was insufflated with carbon dioxide to a pressure of 12 to 15 mmHg.  The patient tolerated insufflation well.   The laparoscope was inserted and the abdomen inspected.  No injuries from initial trocar placement were noted.  Under direct visualization a 12 mm trocar was inserted under direct visualization in the left lower quadrant lateral to the rectus muscle.  Care was taken to avoid injury to the bladder or inferior epigastric vessels.  A 5 mm port was then placed in the suprapubic region under direct visualization.  The table was placed in the Trendelenburg position with the right side elevated.  The appendix was gently grasped with an endoscopic grasper and retracted upwards and toward the pelvis.  It was noted to be inflamed and hyperemic.  A window was developed in the mesoappendix at a point between the base of the appendix and the cecum.  An endoscopic linear cutting stapler was then used to divide and staple the base of the appendix.  It was reloaded with a vascular cartridge and the mesoappendix similarly divided.  It was noted that at the lateral aspect of the staple line on the mesoappendix there was a slow ooze.  Laparoscopic clip applier was utilized and 1 clip was placed which resulted in hemostasis.  The appendiceal stump was then irrigated and hemostasis was assured.  Fluid was suctioned and no other pathology was identified.  Secondary trocars were removed under direct visualization.  No bleeding was noted at the trocar sites.  The laparoscope was withdrawn and the umbilical trocar removed.  The abdomen was allowed to collapse.      Utilizing a suture passer, the fascia of the 12 mm port was closed with an 0 Vicryl suture.  Skin of all port sites was closed utilizing 4-0 Monocryl suture in a subcuticular fashion.  Exofin glue was utilized over the port sites.     The patient was then allowed to awaken, extubated, and transferred to the PACU having tolerated the procedure well. All instrument, needle, and sponge counts were correct at the end of the case. Radiofrequency detection was negative.     Dr. Ullrich  was present for the entire procedure.    Patient Disposition:  PACU       SIGNATURE: Ivan Ann MD  DATE: October 20, 2024  TIME: 5:43 PM

## 2024-10-20 NOTE — ANESTHESIA POSTPROCEDURE EVALUATION
Post-Op Assessment Note    CV Status:  Stable  Pain Score: 0    Pain management: adequate       Mental Status:  Alert and awake   Hydration Status:  Euvolemic   PONV Controlled:  Controlled   Airway Patency:  Patent  Airway: intubated  Two or more mitigation strategies used for obstructive sleep apnea   Post Op Vitals Reviewed: Yes    No anethesia notable event occurred.    Staff: CRNA           Last Filed PACU Vitals:  Vitals Value Taken Time   Temp 97.9    Pulse 102    /79    Resp 15    SpO2 100        Modified Jaswant:  Activity: 2 (10/20/2024  5:30 PM)  Respiration: 2 (10/20/2024  5:30 PM)  Circulation: 2 (10/20/2024  5:30 PM)  Consciousness: 1 (10/20/2024  5:30 PM)  Oxygen Saturation: 1 (10/20/2024  5:30 PM)  Modified Jaswant Score: 8 (10/20/2024  5:30 PM)

## 2024-10-20 NOTE — H&P
H&P - Surgery-General   Name: Jeff Abebe 24 y.o. adult I MRN: [id number]  Unit/Bed#: ED-05 I Date of Admission: 10/20/2024   Date of Service: 10/20/2024 I Hospital Day: 0     Assessment & Plan  Acute appendicitis with localized peritonitis  Patient is a 24-year-old male who presents with 1 day of right lower quadrant pain.  CT scan demonstrated acute appendicitis with appendicolith.  He is currently hemodynamically stable.    -Will plan for laparoscopic appendectomy today  -N.p.o. for OR  -Will start antibiotics with Rocephin and Flagyl  -Pain and nausea control as needed    History of Present Illness   Jeff Abebe is a 24 y.o. adult with no significant past medical history who presents with right lower quadrant pain since 6 PM last evening.  He reports having nausea but no vomiting.  His last meal was at 6 to 7 PM last evening.  He has had a small amount of water this morning.  He said the pain initially began in the middle of his abdomen in the periumbilical region and then moved to his right lower quadrant.  He has a chills but no fevers, shortness of breath, chest pain.  He has been having bowel function.  He is voiding well.    CT abdomen pelvis was obtained which demonstrated dilated appendix with a 9 mm appendicolith.  Labs are significant for white blood cell count 12.87, hemoglobin 17.6.  Creatinine 1.18.    He does report having a recent left shoulder surgery for labrum repair in April.    Review of Systems   Constitutional:  Positive for chills. Negative for fever.   HENT:  Negative for trouble swallowing.    Eyes:  Negative for visual disturbance.   Respiratory:  Negative for shortness of breath.    Cardiovascular:  Negative for chest pain.   Gastrointestinal:  Positive for abdominal pain and nausea. Negative for vomiting.   Genitourinary:  Negative for difficulty urinating.   Musculoskeletal:  Negative for joint swelling.   Neurological:  Negative for light-headedness.   Psychiatric/Behavioral:   Negative for behavioral problems.      I have reviewed the patient's PMH, PSH, Social History, Family History, Meds, and Allergies    Objective :  Temp:  [98.2 °F (36.8 °C)] 98.2 °F (36.8 °C)  HR:  [86-89] 89  BP: (141-159)/() 141/76  Resp:  [18] 18  SpO2:  [98 %-99 %] 98 %  O2 Device: None (Room air)      Physical Exam  Constitutional:       Appearance: Normal appearance.   HENT:      Head: Normocephalic and atraumatic.      Right Ear: External ear normal.      Left Ear: External ear normal.      Nose: Nose normal.   Eyes:      Conjunctiva/sclera: Conjunctivae normal.   Cardiovascular:      Rate and Rhythm: Normal rate.      Comments: Appears well-perfused  Pulmonary:      Effort: Pulmonary effort is normal. No respiratory distress.   Abdominal:      General: Abdomen is flat. There is no distension.      Palpations: Abdomen is soft.      Tenderness: There is abdominal tenderness. There is rebound.      Comments: Patient has focal abdominal tenderness at McBurney's point.  Positive Rovsing sign.  Positive rebound tenderness.  Voluntary guarding in the right lower quadrant.   Skin:     General: Skin is warm and dry.   Neurological:      General: No focal deficit present.      Mental Status: He is alert and oriented to person, place, and time.   Psychiatric:         Mood and Affect: Mood normal.         Behavior: Behavior normal.         Lab Results: I have reviewed the following results:  Recent Labs     10/20/24  1305   WBC 12.87*   HGB 17.6*   HCT 50.3*      SODIUM 136   K 3.4*      CO2 24   BUN 12   CREATININE 1.18   GLUC 104       Imaging Results Review: I reviewed radiology reports from this admission including: CT abdomen/pelvis.  Other Study Results Review: No additional pertinent studies reviewed.

## 2024-10-20 NOTE — ASSESSMENT & PLAN NOTE
Patient is a 24-year-old male who presents with 1 day of right lower quadrant pain.  CT scan demonstrated acute appendicitis with appendicolith.  He is currently hemodynamically stable.    -Will plan for laparoscopic appendectomy today  -N.p.o. for OR  -Will start antibiotics with Rocephin and Flagyl  -Pain and nausea control as needed

## 2024-10-20 NOTE — ANESTHESIA PREPROCEDURE EVALUATION
Procedure:  APPENDECTOMY LAPAROSCOPIC (Abdomen)    Relevant Problems   GI/HEPATIC   (+) Gastroesophageal reflux disease without esophagitis        Physical Exam    Airway    Mallampati score: II  TM Distance: >3 FB  Neck ROM: full     Dental   No notable dental hx     Cardiovascular  Cardiovascular exam normal    Pulmonary  Pulmonary exam normal     Other Findings        Anesthesia Plan  ASA Score- 1 Emergent    Anesthesia Type- general with ASA Monitors.         Additional Monitors:     Airway Plan: ETT.           Plan Factors-Exercise tolerance (METS): >4 METS.    Chart reviewed.  Imaging results reviewed. Existing labs reviewed. Patient summary reviewed.    Patient is not a current smoker.      Obstructive sleep apnea risk education given perioperatively.        Induction- intravenous.    Postoperative Plan- Plan for postoperative opioid use.     Perioperative Resuscitation Plan - Level 1 - Full Code.       Informed Consent-

## 2024-10-20 NOTE — LETTER
[facility] FLO[facility] MED SURG UNIT  [address]  [place] [address]  Dept: [phone]    October 21, 2024     Patient: Jeff Abebe   YOB: 2000   Date of Visit: 10/20/2024       To Whom it May Concern:    Jeff Abebe is under my professional care. He was seen in the hospital from 10/20/2024 to 10/21/24. He may return to work on 10/28/2024 with the following limitations light duty no lifting greater than 10-15 lbs until cleared by surgeon .    If you have any questions or concerns, please don't hesitate to call.         Sincerely,          Josselyn López MD

## 2024-10-20 NOTE — QUICK NOTE
Postop Check    Procedure: Laparoscopic appendectomy    Subjective:  No acute distress.  Resting comfortably in bed.  Patient reports he has pain in his right lower quadrant and suprapubic region.  He is has a little bit of nausea but denies vomiting, fever, chills, chest pain, shortness of breath.  Tolerating a diet.  Has voided since surgery.    Objective  Vitals:    10/20/24 1730 10/20/24 1745 10/20/24 1800 10/20/24 1912   BP: 144/79 151/75 156/89 142/83   BP Location:       Pulse: 105 100 93 73   Resp: 18 18 18 16   Temp: 97.9 °F (36.6 °C) 97.8 °F (36.6 °C) 97.5 °F (36.4 °C) 97.6 °F (36.4 °C)   TempSrc:       SpO2: 100% 99% 99% 99%         GENERAL: No acute distress, resting in bed  CV: Regular rate, appears well-perfused  LUNGS: Nonlabored respirations, normal work of breathing  ABDOMEN: Abdomen soft, appropriate tenderness in the suprapubic region and right lower quadrant, nondistended.  MSK: No edema bilaterally  NEURO: A&O x3    Assessment and Plan:  Status post laparoscopic appendectomy.  Patient is overall doing well and is hemodynamically stable.    Plan for discharge 10/21.  IV fluids  Incentive spirometry  DVT prophylaxis  Analgesia  Monitor and replete electrolytes    Ivan Ann MD

## 2024-10-20 NOTE — ED PROVIDER NOTES
Time reflects when diagnosis was documented in both MDM as applicable and the Disposition within this note       Time User Action Codes Description Comment    10/20/2024  2:56 PM Filiberto Torres Add [K35.30] Acute appendicitis with localized peritonitis, without perforation, abscess, or gangrene     10/20/2024  4:49 PM Kilo Luisa Modify [K35.30] Acute appendicitis with localized peritonitis, without perforation, abscess, or gangrene           ED Disposition       None          Assessment & Plan       Medical Decision Making  24-year-old male with no PMH presenting for sudden onset abdominal pain last night.    Differential diagnoses: Appendicitis, cystitis, UTI, nephrolithiasis    Patient presents afebrile and hemodynamically stable.  Mild leukocytosis on CBC.  Otherwise BMP within normal limits.  UA negative for blood or UTI. Abdominal pain well-controlled with IV dilaudid. CT abdomen/pelvis shows a dilated appendix most compatible with acute appendicitis without any signs of perforation.  Patient accepted by the general surgery team and taken to the OR for laparoscopic appendectomy.     Amount and/or Complexity of Data Reviewed  Labs: ordered. Decision-making details documented in ED Course.  Radiology: ordered.    Risk  Prescription drug management.        ED Course as of 10/20/24 2009   Sun Oct 20, 2024   1336 WBC(!): 12.87  Mild leukocytosis.    1336 Basic metabolic panel(!)  BMP within normal limits.    1336 Nitrite, UA: Negative   1336 Leukocytes, UA: Negative   1417 Patient reassessed, resting comfortably and pain managed.  Updated regarding lab results.  Pending official CT read.   1439 Updated patient regarding CT findings showing acute uncomplicated appendicitis.        Medications   metroNIDAZOLE (FLAGYL) IVPB (premix) 500 mg 100 mL (has no administration in time range)   oxyCODONE (ROXICODONE) split tablet 2.5 mg (has no administration in time range)   oxyCODONE (ROXICODONE) IR tablet 5 mg (has  no administration in time range)   HYDROmorphone HCl (DILAUDID) injection 0.2 mg (0.2 mg Intravenous Given 10/20/24 1537)   ceFAZolin (ANCEF) IVPB (premix in dextrose) 2,000 mg 50 mL (has no administration in time range)   lactated ringers bolus 500 mL (0 mL Intravenous Stopped 10/20/24 1448)   HYDROmorphone (DILAUDID) injection 0.5 mg (0.5 mg Intravenous Given 10/20/24 1304)   iohexol (OMNIPAQUE) 350 MG/ML injection (MULTI-DOSE) 75 mL (75 mL Intravenous Given 10/20/24 1314)       ED Risk Strat Scores                           SBIRT 22yo+      Flowsheet Row Most Recent Value   Initial Alcohol Screen: US AUDIT-C     1. How often do you have a drink containing alcohol? 2 Filed at: 10/20/2024 1146   2. How many drinks containing alcohol do you have on a typical day you are drinking?  1 Filed at: 10/20/2024 1146   3a. Male UNDER 65: How often do you have five or more drinks on one occasion? 0 Filed at: 10/20/2024 1146   3b. FEMALE Any Age, or MALE 65+: How often do you have 4 or more drinks on one occassion? 0 Filed at: 10/20/2024 1146   Audit-C Score 3 Filed at: 10/20/2024 1146   FABIOLA: How many times in the past year have you...    Used an illegal drug or used a prescription medication for non-medical reasons? Never Filed at: 10/20/2024 1146                            History of Present Illness       Chief Complaint   Patient presents with    Abdominal Pain     Pt c/o RLQ abdominal pain radiating to the left as well that began last night. Pt reports nausea, denies vomiting or diarrhea. Pt reports burning with urination last night        History reviewed. No pertinent past medical history.   Past Surgical History:   Procedure Laterality Date    SHOULDER SURGERY Left 04/14/2024      Family History   Problem Relation Age of Onset    Diabetes Father       Social History     Tobacco Use    Smoking status: Never     Passive exposure: Past    Smokeless tobacco: Never   Substance Use Topics    Alcohol use: Never    Drug use:  Never      E-Cigarette/Vaping    E-Cigarette Use Never Assessed       E-Cigarette/Vaping Substances      I have reviewed and agree with the history as documented.     24-year-old male with no PMH presenting for sudden onset abdominal pain.  Patient reports that his pain started in the center of his abdomen last night, sudden in onset, and eventually migrated toward his RLQ. Current pain right now is mostly in the RLQ with some radiation into the left lower abdomen.  Patient describes the pain as a bad stomachache and cramping.  Denies any fevers, but endorses chills and sweats as well as associated nausea, but no vomiting or diarrhea.  Patient also endorses limited appetite and a lower backache since the pain started.  Patient recently had some burning upon urination, that improved after hydration and cranberry juice. Denies any chest pain, SOB, hematuria, or any further dysuria. Denies any previous abdominal surgeries.       Abdominal Pain  Associated symptoms: chills, dysuria and nausea    Associated symptoms: no chest pain, no diarrhea, no fever, no hematuria, no shortness of breath and no vomiting        Review of Systems   Constitutional:  Positive for appetite change, chills and diaphoresis. Negative for fever.   Eyes:  Negative for visual disturbance.   Respiratory:  Negative for shortness of breath.    Cardiovascular:  Negative for chest pain and palpitations.   Gastrointestinal:  Positive for abdominal pain and nausea. Negative for diarrhea and vomiting.   Genitourinary:  Positive for dysuria. Negative for hematuria.   Musculoskeletal:  Positive for back pain. Negative for arthralgias and myalgias.   Skin:  Negative for rash and wound.   Neurological:  Negative for light-headedness and headaches.   Psychiatric/Behavioral:  Negative for confusion.    All other systems reviewed and are negative.          Objective       ED Triage Vitals   Temperature Pulse Blood Pressure Respirations SpO2 Patient Position -  Orthostatic VS   10/20/24 1148 10/20/24 1148 10/20/24 1148 10/20/24 1148 10/20/24 1148 10/20/24 1148   98.2 °F (36.8 °C) 87 (!) 159/107 18 99 % Sitting      Temp Source Heart Rate Source BP Location FiO2 (%) Pain Score    10/20/24 1148 10/20/24 1148 10/20/24 1148 -- 10/20/24 1304    Oral Monitor Right arm  6      Vitals      Date and Time Temp Pulse SpO2 Resp BP Pain Score FACES Pain Rating User   10/20/24 2009 -- 80 100 % -- 133/84 -- -- DII   10/20/24 1942 -- -- -- -- -- 6 -- NA   10/20/24 1912 97.6 °F (36.4 °C) 73 99 % 16 142/83 -- -- DII   10/20/24 1902 -- -- -- -- -- 7 -- NA   10/20/24 1800 97.5 °F (36.4 °C) 93 99 % 18 156/89 No Pain -- ES   10/20/24 1745 97.8 °F (36.6 °C) 100 99 % 18 151/75 No Pain -- ES   10/20/24 1730 97.9 °F (36.6 °C) 105 100 % 18 144/79 No Pain -- ES   10/20/24 1553 98.9 °F (37.2 °C) 100 100 % 18 131/84 -- -- JMS   10/20/24 1537 -- -- -- -- -- 5 -- DLB   10/20/24 1530 -- 83 99 % 16 137/71 -- -- DLB   10/20/24 1523 100.3 °F (37.9 °C) -- -- -- -- 4 -- DLB   10/20/24 1449 -- 89 98 % 18 141/76 -- -- DLB   10/20/24 1345 -- 86 98 % 18 145/70 -- -- AG   10/20/24 1334 -- -- -- -- -- No Pain -- AG   10/20/24 1304 -- -- -- -- -- 6 -- AG   10/20/24 1148 98.2 °F (36.8 °C) 87 99 % 18 159/107 -- -- EV            Physical Exam  Vitals and nursing note reviewed.   Constitutional:       General: He is not in acute distress.     Appearance: He is well-developed and normal weight. He is ill-appearing. He is not diaphoretic.   HENT:      Head: Normocephalic and atraumatic.      Right Ear: External ear normal.      Left Ear: External ear normal.      Nose: Nose normal.      Mouth/Throat:      Mouth: Mucous membranes are moist.   Eyes:      General: No scleral icterus.        Right eye: No discharge.         Left eye: No discharge.      Extraocular Movements: Extraocular movements intact.      Conjunctiva/sclera: Conjunctivae normal.   Cardiovascular:      Rate and Rhythm: Normal rate and regular rhythm.       Heart sounds: Normal heart sounds. No murmur heard.  Pulmonary:      Effort: Pulmonary effort is normal. No respiratory distress.      Breath sounds: Normal breath sounds. No wheezing, rhonchi or rales.   Abdominal:      General: Abdomen is flat. There is no distension.      Palpations: Abdomen is soft.      Tenderness: There is abdominal tenderness. There is no right CVA tenderness, left CVA tenderness, guarding or rebound.      Comments: RLQ, LLQ, and suprapubic tenderness to palpation, most prominent in RLQ.   Musculoskeletal:         General: Normal range of motion.      Cervical back: Normal range of motion.      Right lower leg: No edema.      Left lower leg: No edema.   Skin:     General: Skin is warm and dry.      Capillary Refill: Capillary refill takes less than 2 seconds.   Neurological:      General: No focal deficit present.      Mental Status: He is alert and oriented to person, place, and time. Mental status is at baseline.   Psychiatric:         Mood and Affect: Mood normal.         Behavior: Behavior normal.         Results Reviewed       Procedure Component Value Units Date/Time    Basic metabolic panel [[id number]]  (Abnormal) Collected: 10/20/24 1305    Lab Status: Final result Specimen: Blood from Arm, Right Updated: 10/20/24 1327     Sodium 136 mmol/L      Potassium 3.4 mmol/L      Chloride 101 mmol/L      CO2 24 mmol/L      ANION GAP 11 mmol/L      BUN 12 mg/dL      Creatinine 1.18 mg/dL      Glucose 104 mg/dL      Calcium 9.9 mg/dL      eGFR --    Narrative:      Notes:     1. eGFR calculation is only valid for adults 18 years and older.  2. EGFR calculation cannot be performed for patients who are transgender, non-binary, or whose legal sex, sex at birth, and gender identity differ.    CBC and differential [[id number]]  (Abnormal) Collected: 10/20/24 1305    Lab Status: Final result Specimen: Blood from Arm, Right Updated: 10/20/24 1317     WBC 12.87 Thousand/uL      RBC 6.05 Million/uL       Hemoglobin 17.6 g/dL      Hematocrit 50.3 %      MCV 83 fL      MCH 29.1 pg      MCHC 35.0 g/dL      RDW 11.9 %      MPV 10.9 fL      Platelets 235 Thousands/uL      nRBC 0 /100 WBCs      Segmented % 83 %      Immature Grans % 0 %      Lymphocytes % 8 %      Monocytes % 8 %      Eosinophils Relative 1 %      Basophils Relative 0 %      Absolute Neutrophils 10.56 Thousands/µL      Absolute Immature Grans 0.04 Thousand/uL      Absolute Lymphocytes 1.06 Thousands/µL      Absolute Monocytes 1.03 Thousand/µL      Eosinophils Absolute 0.14 Thousand/µL      Basophils Absolute 0.04 Thousands/µL     UA (URINE) with reflex to Scope [[id number]]  (Abnormal) Collected: 10/20/24 1305    Lab Status: Final result Specimen: Urine, Clean Catch Updated: 10/20/24 1315     Color, UA Light Yellow     Clarity, UA Clear     Specific Gravity, UA 1.021     pH, UA 7.0     Leukocytes, UA Negative     Nitrite, UA Negative     Protein, UA Negative mg/dl      Glucose, UA Negative mg/dl      Ketones, UA 20 (1+) mg/dl      Urobilinogen, UA <2.0 mg/dl      Bilirubin, UA Negative     Occult Blood, UA Negative            CT abdomen pelvis with contrast   ED Interpretation by Shayy Maldonado MD (10/20 4968)   FINDINGS:     ABDOMEN     LOWER CHEST: No clinically significant abnormality in the visualized lower chest.     LIVER/BILIARY TREE: Unremarkable.     GALLBLADDER: No calcified gallstones. No pericholecystic inflammatory change.     SPLEEN: Unremarkable.     PANCREAS: Unremarkable.     ADRENAL GLANDS: Unremarkable.     KIDNEYS/URETERS: Unremarkable. No hydronephrosis.     STOMACH AND BOWEL: Best seen on series 601 images 62-65 and series 301 images 118-134, there is a 9 mm appendicolith with dilatation and mild wall enhancement of the appendix measuring 1 cm. Subtle periappendiceal fat stranding is present. Findings are   most compatible with acute appendicitis.     APPENDIX: No findings to suggest appendicitis.     ABDOMINOPELVIC CAVITY: No  ascites. No pneumoperitoneum. No lymphadenopathy.     VESSELS: Unremarkable for patient's age.     PELVIS     REPRODUCTIVE ORGANS: Unremarkable for patient's age.     URINARY BLADDER: Unremarkable.     ABDOMINAL WALL/INGUINAL REGIONS: Unremarkable.        BONES: No acute fracture or suspicious osseous lesion.     IMPRESSION:  1. Dilated appendix most compatible with acute appendicitis.     I personally discussed this study with Dr. SKYLAR BLUNT on 10/20/2024 2:35 PM.     Workstation performed: [id number]      Final Interpretation by Ortega Navarrete MD (10/20 1438)   1. Dilated appendix most compatible with acute appendicitis.            I personally discussed this study with Dr. SKYLAR BLUNT on 10/20/2024 2:35 PM.            Workstation performed: [id number]             Procedures    ED Medication and Procedure Management   Prior to Admission Medications   Prescriptions Last Dose Informant Patient Reported? Taking?   dicyclomine (BENTYL) 20 mg tablet   No No   Sig: Take 1 tablet (20 mg total) by mouth 3 (three) times a day as needed (Abdominal pain)   sodium picosulfate, magnesium oxide, citric acid (Clenpiq) oral solution   No No   Sig: Take 175 mL (1 bottle) the evening before the colonoscopy, between 5 PM and 9 PM, followed by a second 175 mL bottle 5 hours before the colonoscopy.      Facility-Administered Medications: None     Current Discharge Medication List        CONTINUE these medications which have NOT CHANGED    Details   dicyclomine (BENTYL) 20 mg tablet Take 1 tablet (20 mg total) by mouth 3 (three) times a day as needed (Abdominal pain)  Qty: 30 tablet, Refills: 1    Associated Diagnoses: Lower abdominal pain      sodium picosulfate, magnesium oxide, citric acid (Clenpiq) oral solution Take 175 mL (1 bottle) the evening before the colonoscopy, between 5 PM and 9 PM, followed by a second 175 mL bottle 5 hours before the colonoscopy.  Qty: 350 mL, Refills: 0    Associated Diagnoses: Diarrhea,  unspecified type           No discharge procedures on file.  ED SEPSIS DOCUMENTATION   Time reflects when diagnosis was documented in both MDM as applicable and the Disposition within this note       Time User Action Codes Description Comment    10/20/2024  2:56 PM Filiberto Torres Add [K35.30] Acute appendicitis with localized peritonitis, without perforation, abscess, or gangrene     10/20/2024  4:49 PM Luisa Boateng Modify [K35.30] Acute appendicitis with localized peritonitis, without perforation, abscess, or gangrene                  Shayy Maldonado MD  10/20/24 2009

## 2024-10-21 VITALS
SYSTOLIC BLOOD PRESSURE: 107 MMHG | OXYGEN SATURATION: 97 % | DIASTOLIC BLOOD PRESSURE: 73 MMHG | HEART RATE: 51 BPM | TEMPERATURE: 97.8 F | RESPIRATION RATE: 18 BRPM

## 2024-10-21 PROCEDURE — NC001 PR NO CHARGE: Performed by: SURGERY

## 2024-10-21 RX ORDER — OXYCODONE HYDROCHLORIDE 5 MG/1
2.5 TABLET ORAL EVERY 6 HOURS PRN
Qty: 4 TABLET | Refills: 0 | Status: SHIPPED | OUTPATIENT
Start: 2024-10-21

## 2024-10-21 RX ORDER — POTASSIUM CHLORIDE 1500 MG/1
40 TABLET, EXTENDED RELEASE ORAL ONCE
Status: COMPLETED | OUTPATIENT
Start: 2024-10-21 | End: 2024-10-21

## 2024-10-21 RX ADMIN — CEFAZOLIN SODIUM 2000 MG: 2 SOLUTION INTRAVENOUS at 08:28

## 2024-10-21 RX ADMIN — ACETAMINOPHEN 975 MG: 325 TABLET ORAL at 08:28

## 2024-10-21 RX ADMIN — CEFAZOLIN SODIUM 2000 MG: 2 SOLUTION INTRAVENOUS at 00:03

## 2024-10-21 RX ADMIN — ACETAMINOPHEN 975 MG: 325 TABLET ORAL at 00:02

## 2024-10-21 RX ADMIN — METRONIDAZOLE 500 MG: 500 INJECTION, SOLUTION INTRAVENOUS at 09:08

## 2024-10-21 RX ADMIN — POTASSIUM CHLORIDE 40 MEQ: 1500 TABLET, EXTENDED RELEASE ORAL at 08:28

## 2024-10-21 NOTE — DISCHARGE INSTR - AVS FIRST PAGE
Acute Care Surgery Discharge Instructions    Please follow-up as instructed. If you do not already have a follow-up appointment, please call the office when you leave to schedule an appointment to be seen in 2-3 weeks for post-operative re-evaluation.    Activity:  - No lifting greater than 20 pounds or strenuous physical activity or exercise for 2 weeks.  - Walking and normal light activities are encouraged.  - Normal daily activities including climbing steps are okay.  - No driving until no longer using pain medications.    Return to work:    - You may return to work in 2 weeks or sooner if you are feeling well enough.    Diet:    - You may resume your normal diet.    Wound Care:  - May shower daily. No tub baths or swimming until cleared by your surgeon.  - Wash incision gently with soap and water and pat dry.  - Do not apply any creams or ointments unless instructed to do so by your surgeon.  - You may apply ice as needed (no longer than 20 minutes at a time) for the first 48 hours.  - Bruising is not unusual and will go away with a little time. You may apply a warm, moist compress that may help the bruising resolve quicker.  - Leave glue on the incision(s) in place until they fall off on their own. Any new dressings are optional.    Medications:    - You may resume all of your regular medications after discharge unless otherwise instructed. Please refer to your discharge medication list for further details.  - Please take the pain medications as directed.  - You are encouraged to use non-narcotic pain medications first and whenever possible. Reserve the use of narcotic pain medication for moderate to severe pain not controlled by non-narcotic medications.  - No driving while taking narcotic pain medications.  - You may become constipated, especially if taking pain medications. You may take any over the counter stool softeners or laxatives as needed. Examples: Milk of Magnesia, Colace, Senna.    Additional  Instructions:  - If you have any questions or concerns after discharge please call the office.  - Call office or return to ER if fever greater than 101, chills, persistent nausea/vomiting, worsening/uncontrollable pain, and/or increasing redness or purulent/foul smelling drainage from incision(s).

## 2024-10-21 NOTE — ASSESSMENT & PLAN NOTE
Patient is a 24-year-old male who presents with 1 day of right lower quadrant pain.  CT scan demonstrated acute appendicitis with appendicolith. POD#1 from laparoscopic appendectomy. He is currently hemodynamically stable and overall doing well this morning.    -Discharge home today  -Regular diet  -Pain and nausea control as needed

## 2024-10-21 NOTE — DISCHARGE SUMMARY
Discharge Summary - Surgery-General   Name: Jeff Abebe 24 y.o. adult I MRN: [id number]  Unit/Bed#: S -01 I Date of Admission: 10/20/2024   Date of Service: 10/21/2024 I Hospital Day: 0    Admission Date: 10/20/2024 1149  Discharge Date: 10/21/24  Admitting Diagnosis: Acute appendicitis with localized peritonitis, without perforation, abscess, or gangrene [K35.30]  Discharge Diagnosis:   Medical Problems       Resolved Problems  Date Reviewed: 3/11/2024   None         HPI: Jeff Abebe is a 24 y.o. adult with no significant past medical history who presents with right lower quadrant pain since 6 PM last evening.  He reports having nausea but no vomiting.  His last meal was at 6 to 7 PM last evening.  He has had a small amount of water this morning.  He said the pain initially began in the middle of his abdomen in the periumbilical region and then moved to his right lower quadrant.  He has a chills but no fevers, shortness of breath, chest pain.  He has been having bowel function.  He is voiding well. CT abdomen pelvis was obtained which demonstrated dilated appendix with a 9 mm appendicolith.  Labs are significant for white blood cell count 12.87, hemoglobin 17.6.  Creatinine 1.18. He does report having a recent left shoulder surgery for labrum repair in April.  - Ivan Ann MD 10/20/24    Procedures Performed: No orders of the defined types were placed in this encounter.      Summary of Hospital Course: Patient presented to ED on 10/20 with RLQ abdominal pain and nausea. CT abdomen pelvis was obtained which demonstrated dilated appendix with a 9 mm appendicolith consistent with appendicitis.  Labs are significant for white blood cell count 12.87. Laparoscopic appendectomy performed on 10/20. Patient pain well controlled, tolerating a diet, voiding, and ambulating post-op. Patient discharge home on 10/21 with instructions to follow-up in 2 weeks.     Significant Findings, Care, Treatment and Services  Provided: 10/20 Laparoscopic appendectomy    Complications: None    Condition at Discharge: good       Discharge instructions/Information to patient and family:   See After Visit Summary (AVS) for information provided to patient and family.      Provisions for Follow-Up Care:  See after visit summary for information related to follow-up care and any pertinent home health orders.      PCP: CAROL ANN Youssef    Disposition: Home    Planned Readmission: No     Discharge Medications:  See after visit summary for reconciled discharge medications provided to patient and family.      Discharge Statement:  I have spent a total time of 30 minutes in caring for this patient on the day of the visit/encounter.

## 2024-10-21 NOTE — PROGRESS NOTES
Progress Note - Surgery-General   Name: Jeff Abebe 24 y.o. adult I MRN: [id number]  Unit/Bed#: S -01 I Date of Admission: 10/20/2024   Date of Service: 10/20/2024 I Hospital Day: 0      Assessment & Plan  Acute appendicitis with localized peritonitis  Patient is a 24-year-old male who presents with 1 day of right lower quadrant pain.  CT scan demonstrated acute appendicitis with appendicolith. POD#1 from laparoscopic appendectomy. He is currently hemodynamically stable and overall doing well this morning.    -Discharge home today  -Regular diet  -Pain and nausea control as needed    Please contact the SecureChat role for the Surgery-General service with any questions/concerns.    Subjective   No acute events overnight.  Pain is well-controlled.  Denies nausea, vomiting, fever, chills, chest pain, shortness of breath.  Tolerating diet.  Voiding.    Objective :  Temp:  [97.5 °F (36.4 °C)-100.3 °F (37.9 °C)] 97.6 °F (36.4 °C)  HR:  [] 80  BP: (131-159)/() 133/84  Resp:  [16-18] 16  SpO2:  [98 %-100 %] 100 %  O2 Device: None (Room air)    I/O         10/19 0701  10/20 0700 10/20 0701  10/21 0700    I.V.  1000    IV Piggyback  650    Total Intake  1650    Net  +1650                  Physical Exam  Constitutional:       Appearance: Normal appearance.   HENT:      Head: Normocephalic and atraumatic.      Right Ear: External ear normal.      Left Ear: External ear normal.      Nose: Nose normal.   Eyes:      Conjunctiva/sclera: Conjunctivae normal.   Cardiovascular:      Rate and Rhythm: Normal rate.      Comments: Appears well-perfused  Pulmonary:      Effort: Pulmonary effort is normal. No respiratory distress.   Abdominal:      General: Abdomen is flat. There is no distension.      Palpations: Abdomen is soft.      Tenderness: There is abdominal tenderness (minimal RLQ tenderness).   Musculoskeletal:      Cervical back: Normal range of motion.   Skin:     General: Skin is warm and dry.   Neurological:       General: No focal deficit present.      Mental Status: He is alert and oriented to person, place, and time.   Psychiatric:         Mood and Affect: Mood normal.         Behavior: Behavior normal.           Lab Results: I have reviewed the following results:  Recent Labs     10/20/24  1305   WBC 12.87*   HGB 17.6*   HCT 50.3*      SODIUM 136   K 3.4*      CO2 24   BUN 12   CREATININE 1.18   GLUC 104       Imaging Results Review: I reviewed radiology reports from this admission including: CT abdomen/pelvis.  Other Study Results Review: No additional pertinent studies reviewed.

## 2024-10-22 NOTE — ED ATTENDING ATTESTATION
10/20/2024  IFiliberto MD, saw and evaluated the patient. I have discussed the patient with the resident/non-physician practitioner and agree with the resident's/non-physician practitioner's findings, Plan of Care, and MDM as documented in the resident's/non-physician practitioner's note, except where noted. All available labs and Radiology studies were reviewed.  I was present for key portions of any procedure(s) performed by the resident/non-physician practitioner and I was immediately available to provide assistance.       At this point I agree with the current assessment done in the Emergency Department.  I have conducted an independent evaluation of this patient a history and physical is as follows:see h and p above    ED Course  ED Course as of 10/22/24 0900   Sun Oct 20, 2024   1228 ER MD PROCEDURE NOTE FOR BEDSIDE TRANSABD U/S-  NO FLUID IN RUQ/LUQ/BEHIND BLADDER- NO GS SEEN- NO R/L HYDRONEPHROSIS    1302 ER MD NOTE- PT SEEN AND THOROUGHLY EVALUATED BY ER MD - CASE D/W ER RESIDENT-  24 YR MALE LAST EVENING WITH RELATIVELY SUDDEN ONSET OF  R MID ABD PAIN --  NEVER REALLY GOING AWAKE- SOME NAUSEA- MILD DYSURIA-- NO FEVERS--  NO CHANGE IN STOOLS- NO RECENT ILLNESS- NO  INJURY -- AVSS- HTNSIVE- WILL RECHECK - PULSE OX 99 % ON RA- INTERPRETATION IS NORMAL- NO INTERVENTION -- RRR S1/S2-CTA-B/-SOFT AB D- MODERATE RLQ/SUPRAPUBIC/LLQ TENDERNESS-  REST OF AB D IS SOFT- NO PERITONEAL SIGNS- NO CVA TENDERNESS- NORMAL  EXAM-    1335 ER MD NOTE- PT- RE-EVAL AFTER CT  BACK FROM CT SCAN AND PAIN MEDS- FEELS MUCH IMPROVED  WITH PAIN- WANTS NOTHING FURTHER FRO PAIN AT THIS TIME- WILL RE-EVAL PENDING CT SCAN READ   1436 ER MD NOTE- RADIOLOGIST CALLED ER MD TO DISCUSS CT SCAN OF ABD/PELVIS- ACUTE UN COMPLICATED APPENDICITIS- - ER RESIDENT WITH DISCUSS WITH PT AND [place] AND EPIC CHAT MESSAGE SENT TO GEN SURG RESIDENT ON CALL          Critical Care Time  Procedures

## 2024-10-23 PROCEDURE — 88304 TISSUE EXAM BY PATHOLOGIST: CPT | Performed by: SPECIALIST

## 2024-10-30 ENCOUNTER — TELEPHONE (OUTPATIENT)
Age: 24
End: 2024-10-30

## 2024-10-30 NOTE — TELEPHONE ENCOUNTER
Pt returned call to [facility] for an earlier appt. Transferred to [facility].

## 2024-10-31 ENCOUNTER — OFFICE VISIT (OUTPATIENT)
Dept: SURGERY | Facility: CLINIC | Age: 24
End: 2024-10-31

## 2024-10-31 VITALS
HEART RATE: 74 BPM | SYSTOLIC BLOOD PRESSURE: 116 MMHG | DIASTOLIC BLOOD PRESSURE: 72 MMHG | RESPIRATION RATE: 16 BRPM | TEMPERATURE: 98.3 F | BODY MASS INDEX: 22.4 KG/M2 | HEIGHT: 72 IN | OXYGEN SATURATION: 98 % | WEIGHT: 165.4 LBS

## 2024-10-31 DIAGNOSIS — Z90.49 S/P APPENDECTOMY: Primary | ICD-10-CM

## 2024-10-31 PROCEDURE — 99024 POSTOP FOLLOW-UP VISIT: CPT | Performed by: SURGERY

## 2024-11-01 PROBLEM — Z90.49 S/P APPENDECTOMY: Status: ACTIVE | Noted: 2024-11-01

## 2024-11-01 NOTE — ASSESSMENT & PLAN NOTE
-Progressing well s/p laparoscopic appendectomy  -Pathology reviewed consistent with appendicitis  -Continue no heavy pushing, pulling, or lifting >20lbs for the next 6 weeks. Works a manual job. Work note provided  -Follow up as needed, encouraged to call clinic with any questions or concerns

## 2024-11-01 NOTE — PROGRESS NOTES
Office Visit - General Surgery  Jeff Abebe MRN: [id number]  Encounter: [id number]  Assessment & Plan    Problem List Items Addressed This Visit       S/P appendectomy - Primary     -Progressing well s/p laparoscopic appendectomy  -Pathology reviewed consistent with appendicitis  -Continue no heavy pushing, pulling, or lifting >20lbs for the next 6 weeks. Works a manual job. Work note provided  -Follow up as needed, encouraged to call clinic with any questions or concerns            Subjective    Jeff Abebe is a 24 y.o. adult who presents for a post-op visit s/p appendectomy 10/20/2024. Overall he has been doing well. He has been tolerating a diet, having regular bowel function, and is not on any pain medication. He plans to follow up with GI in the near future.     Pt  has no past medical history on file.    Pt  has a past surgical history that includes Shoulder surgery (Left, 04/14/2024) and APPENDECTOMY LAPAROSCOPIC (N/A, 10/20/2024).    family history includes Diabetes in his father.    Jeff Abebe reports that he has never smoked. He has been exposed to tobacco smoke. He has never used smokeless tobacco. He reports that he does not drink alcohol and does not use drugs.      Current Outpatient Medications on File Prior to Visit   Medication Sig Dispense Refill    dicyclomine (BENTYL) 20 mg tablet Take 1 tablet (20 mg total) by mouth 3 (three) times a day as needed (Abdominal pain) 30 tablet 1    oxyCODONE (ROXICODONE) 5 immediate release tablet Take 0.5 tablets (2.5 mg total) by mouth every 6 (six) hours as needed for moderate pain or severe pain for up to 8 doses Max Daily Amount: 10 mg 4 tablet 0    sodium picosulfate, magnesium oxide, citric acid (Clenpiq) oral solution Take 175 mL (1 bottle) the evening before the colonoscopy, between 5 PM and 9 PM, followed by a second 175 mL bottle 5 hours before the colonoscopy. 350 mL 0     No current facility-administered medications on file prior to visit.      Allergies   Allergen Reactions    Penicillins Hives     swelling       Review of Systems   Constitutional: Negative.    Respiratory: Negative.     Cardiovascular: Negative.    Gastrointestinal: Negative.    Musculoskeletal: Negative.    Skin: Negative.    Neurological: Negative.        Objective    Vitals:    10/31/24 1528   BP: 116/72   Pulse: 74   Resp: 16   Temp: 98.3 °F (36.8 °C)   SpO2: 98%       Physical Exam  Constitutional:       Appearance: Normal appearance.   Cardiovascular:      Rate and Rhythm: Normal rate and regular rhythm.   Pulmonary:      Effort: Pulmonary effort is normal.   Abdominal:      Comments: Soft, appropriate incisional tenderness, laparoscopic incision sites secured with exofin c/d/i   Musculoskeletal:         General: Normal range of motion.   Skin:     General: Skin is warm and dry.   Neurological:      General: No focal deficit present.      Mental Status: He is alert and oriented to person, place, and time.   Psychiatric:         Mood and Affect: Mood normal.

## 2025-01-06 ENCOUNTER — ANESTHESIA EVENT (OUTPATIENT)
Dept: ANESTHESIOLOGY | Facility: HOSPITAL | Age: 25
End: 2025-01-06

## 2025-01-06 ENCOUNTER — ANESTHESIA (OUTPATIENT)
Dept: ANESTHESIOLOGY | Facility: HOSPITAL | Age: 25
End: 2025-01-06

## 2025-01-19 RX ORDER — SODIUM CHLORIDE, SODIUM LACTATE, POTASSIUM CHLORIDE, CALCIUM CHLORIDE 600; 310; 30; 20 MG/100ML; MG/100ML; MG/100ML; MG/100ML
125 INJECTION, SOLUTION INTRAVENOUS CONTINUOUS
Status: CANCELLED | OUTPATIENT
Start: 2025-01-19

## 2025-01-20 ENCOUNTER — ANESTHESIA (OUTPATIENT)
Dept: GASTROENTEROLOGY | Facility: AMBULARY SURGERY CENTER | Age: 25
End: 2025-01-20
Payer: COMMERCIAL

## 2025-01-20 ENCOUNTER — HOSPITAL ENCOUNTER (OUTPATIENT)
Dept: GASTROENTEROLOGY | Facility: AMBULARY SURGERY CENTER | Age: 25
Setting detail: OUTPATIENT SURGERY
Discharge: HOME | End: 2025-01-20
Attending: INTERNAL MEDICINE
Payer: COMMERCIAL

## 2025-01-20 VITALS
HEART RATE: 85 BPM | DIASTOLIC BLOOD PRESSURE: 60 MMHG | HEIGHT: 72 IN | WEIGHT: 153 LBS | BODY MASS INDEX: 20.72 KG/M2 | OXYGEN SATURATION: 97 % | SYSTOLIC BLOOD PRESSURE: 123 MMHG | RESPIRATION RATE: 20 BRPM | TEMPERATURE: 97 F

## 2025-01-20 DIAGNOSIS — K21.9 GASTROESOPHAGEAL REFLUX DISEASE WITHOUT ESOPHAGITIS: ICD-10-CM

## 2025-01-20 DIAGNOSIS — R10.30 LOWER ABDOMINAL PAIN: Primary | ICD-10-CM

## 2025-01-20 DIAGNOSIS — R19.7 DIARRHEA, UNSPECIFIED TYPE: ICD-10-CM

## 2025-01-20 PROCEDURE — 45380 COLONOSCOPY AND BIOPSY: CPT | Performed by: INTERNAL MEDICINE

## 2025-01-20 PROCEDURE — 43239 EGD BIOPSY SINGLE/MULTIPLE: CPT | Performed by: INTERNAL MEDICINE

## 2025-01-20 PROCEDURE — 88305 TISSUE EXAM BY PATHOLOGIST: CPT | Performed by: PATHOLOGY

## 2025-01-20 RX ORDER — SODIUM CHLORIDE, SODIUM LACTATE, POTASSIUM CHLORIDE, CALCIUM CHLORIDE 600; 310; 30; 20 MG/100ML; MG/100ML; MG/100ML; MG/100ML
125 INJECTION, SOLUTION INTRAVENOUS CONTINUOUS
Status: DISCONTINUED | OUTPATIENT
Start: 2025-01-20 | End: 2025-01-24 | Stop reason: HOSPADM

## 2025-01-20 RX ORDER — CHOLESTYRAMINE 4 G/9G
1 POWDER, FOR SUSPENSION ORAL 3 TIMES DAILY PRN
Qty: 60 PACKET | Refills: 1 | Status: SHIPPED | OUTPATIENT
Start: 2025-01-20 | End: 2025-02-09

## 2025-01-20 RX ORDER — MULTIVITAMIN
1 TABLET ORAL DAILY
COMMUNITY

## 2025-01-20 RX ORDER — PROPOFOL 10 MG/ML
INJECTION, EMULSION INTRAVENOUS CONTINUOUS PRN
Status: DISCONTINUED | OUTPATIENT
Start: 2025-01-20 | End: 2025-01-20

## 2025-01-20 RX ORDER — LIDOCAINE HYDROCHLORIDE 10 MG/ML
INJECTION, SOLUTION EPIDURAL; INFILTRATION; INTRACAUDAL; PERINEURAL AS NEEDED
Status: DISCONTINUED | OUTPATIENT
Start: 2025-01-20 | End: 2025-01-20

## 2025-01-20 RX ORDER — PROPOFOL 10 MG/ML
INJECTION, EMULSION INTRAVENOUS AS NEEDED
Status: DISCONTINUED | OUTPATIENT
Start: 2025-01-20 | End: 2025-01-20

## 2025-01-20 RX ORDER — SIMETHICONE 40MG/0.6ML
SUSPENSION, DROPS(FINAL DOSAGE FORM)(ML) ORAL AS NEEDED
Status: COMPLETED | OUTPATIENT
Start: 2025-01-20 | End: 2025-01-20

## 2025-01-20 RX ORDER — DICYCLOMINE HCL 20 MG
20 TABLET ORAL 3 TIMES DAILY PRN
Qty: 20 TABLET | Refills: 1 | Status: SHIPPED | OUTPATIENT
Start: 2025-01-20

## 2025-01-20 RX ADMIN — SODIUM CHLORIDE, SODIUM LACTATE, POTASSIUM CHLORIDE, AND CALCIUM CHLORIDE: .6; .31; .03; .02 INJECTION, SOLUTION INTRAVENOUS at 12:15

## 2025-01-20 RX ADMIN — PROPOFOL 100 MCG/KG/MIN: 10 INJECTION, EMULSION INTRAVENOUS at 12:18

## 2025-01-20 RX ADMIN — LIDOCAINE HYDROCHLORIDE 50 MG: 10 INJECTION, SOLUTION EPIDURAL; INFILTRATION; INTRACAUDAL at 12:18

## 2025-01-20 RX ADMIN — Medication 40 MG: at 12:28

## 2025-01-20 RX ADMIN — PROPOFOL 150 MG: 10 INJECTION, EMULSION INTRAVENOUS at 12:18

## 2025-01-20 NOTE — ANESTHESIA PREPROCEDURE EVALUATION
"Procedure:  COLONOSCOPY  EGD    Relevant Problems   ANESTHESIA (within normal limits)      CARDIO (within normal limits)      ENDO (within normal limits)      GI/HEPATIC   (+) Gastroesophageal reflux disease without esophagitis      /RENAL (within normal limits)      GYN (within normal limits)      HEMATOLOGY (within normal limits)      MUSCULOSKELETAL (within normal limits)      NEURO/PSYCH (within normal limits)      PULMONARY (within normal limits)      Lab Results   Component Value Date    WBC 12.87 (H) 10/20/2024    HGB 17.6 (H) 10/20/2024    HCT 50.3 (H) 10/20/2024    MCV 83 10/20/2024     10/20/2024     Lab Results   Component Value Date    SODIUM 136 10/20/2024    K 3.4 (L) 10/20/2024     10/20/2024    CO2 24 10/20/2024    AGAP 11 10/20/2024    BUN 12 10/20/2024    CREATININE 1.18 10/20/2024    GLUC 104 10/20/2024    GLUF 97 03/12/2024    CALCIUM 9.9 10/20/2024    AST 29 03/12/2024    ALT 44 03/12/2024    ALKPHOS 46 03/12/2024    TP 7.2 03/12/2024    TBILI 0.82 03/12/2024    EGFR 83 03/12/2024     No results found for: \"PTT\"  No results found for: \"INR\", \"PROTIME\"    Physical Exam    Airway    Mallampati score: II  TM Distance: >3 FB  Neck ROM: full     Dental       Cardiovascular  Cardiovascular exam normal    Pulmonary  Pulmonary exam normal Breath sounds clear to auscultation    Other Findings        Anesthesia Plan  ASA Score- 1     Anesthesia Type- IV sedation with anesthesia with ASA Monitors.         Additional Monitors:     Airway Plan:            Plan Factors-Exercise tolerance (METS): >4 METS.    Chart reviewed. EKG reviewed.  Existing labs reviewed. Patient summary reviewed.          Obstructive sleep apnea risk education given perioperatively.        Induction- intravenous.    Postoperative Plan-         Informed Consent- Anesthetic plan and risks discussed with patient.  I personally reviewed this patient with the CRNA. Discussed and agreed on the Anesthesia Plan with the " CRNA..      NPO Status:  Vitals Value Taken Time   Date of last liquid 01/20/25 01/20/25 1201   Time of last liquid 0830 01/20/25 1201   Date of last solid 01/19/25 01/20/25 1201   Time of last solid 0001 01/20/25 1201

## 2025-01-20 NOTE — H&P
History and Physical -  [facility]  Jeff Abebe 24 y.o. male MRN: [id number]        HPI: 24-year-old patient with symptoms of GERD and diarrhea    Historical Information   History reviewed. No pertinent past medical history.  Past Surgical History:   Procedure Laterality Date    APPENDECTOMY      APPENDECTOMY LAPAROSCOPIC N/A 10/20/2024    Procedure: APPENDECTOMY LAPAROSCOPIC;  Surgeon: Lauryn Ullrich, DO;  Location: [facility];  Service: General    SHOULDER SURGERY Left 04/14/2024     Social History   Social History     Substance and Sexual Activity   Alcohol Use Not Currently    Comment: rare     Social History     Substance and Sexual Activity   Drug Use Never     Social History     Tobacco Use   Smoking Status Never    Passive exposure: Past   Smokeless Tobacco Never     Family History   Problem Relation Age of Onset    Diabetes Father        Meds/Allergies     Not in a hospital admission.    Allergies   Allergen Reactions    Penicillins Hives     swelling       Objective     Blood pressure 135/80, pulse 69, temperature (!) 96.8 °F (36 °C), temperature source Temporal, resp. rate 16, height 6' (1.829 m), weight 69.4 kg (153 lb), SpO2 100%.    Physical Exam:    Chest- CTA  Heart- RRR  Abdomen- NT/ND  Extremities- No edema    ASSESSMENT:     GERD, diarrhea    PLAN:    EGD and colonoscopy

## 2025-01-20 NOTE — ANESTHESIA POSTPROCEDURE EVALUATION
Post-Op Assessment Note    CV Status:  Stable  Pain Score: 0    Pain management: adequate       Mental Status:  Alert and awake   Hydration Status:  Stable   PONV Controlled:  None   Airway Patency:  Patent     Post Op Vitals Reviewed: Yes    No anethesia notable event occurred.    Staff: CRNA           Last Filed PACU Vitals:  Vitals Value Taken Time   Temp     Pulse     BP     Resp     SpO2

## 2025-01-24 ENCOUNTER — RESULTS FOLLOW-UP (OUTPATIENT)
Age: 25
End: 2025-01-24

## 2025-01-24 PROCEDURE — 88305 TISSUE EXAM BY PATHOLOGIST: CPT | Performed by: PATHOLOGY

## 2025-07-08 ENCOUNTER — TELEPHONE (OUTPATIENT)
Dept: FAMILY MEDICINE CLINIC | Facility: CLINIC | Age: 25
End: 2025-07-08

## 2025-07-08 ENCOUNTER — TELEPHONE (OUTPATIENT)
Age: 25
End: 2025-07-08

## 2025-07-08 NOTE — TELEPHONE ENCOUNTER
LM for return call - please schedule nurse visit for tomorrow 7/9 if possible, AFTER 9:30am. Patient getting Tdap vaccine

## 2025-07-08 NOTE — TELEPHONE ENCOUNTER
Patient stepped on a nail last night about 10pm and wants to know if he should update his tetanus shot. Had last one 7/28/2016. He is available tomorrow morning at 8:30am if he needs a nurse visit.

## 2025-07-09 ENCOUNTER — CLINICAL SUPPORT (OUTPATIENT)
Dept: FAMILY MEDICINE CLINIC | Facility: CLINIC | Age: 25
End: 2025-07-09
Payer: COMMERCIAL

## 2025-07-09 DIAGNOSIS — Z23 ENCOUNTER FOR IMMUNIZATION: Primary | ICD-10-CM

## 2025-07-09 PROCEDURE — 90471 IMMUNIZATION ADMIN: CPT

## 2025-07-09 PROCEDURE — 90715 TDAP VACCINE 7 YRS/> IM: CPT

## (undated) DEVICE — THE ECHELON FLEX POWERED PLUS ARTICULATING ENDOSCOPIC LINEAR CUTTERS ARE STERILE, SINGLE PATIENT USE INSTRUMENTS THAT SIMULTANEOUSLYCUT AND STAPLE TISSUE. THERE ARE SIX STAGGERED ROWS OF STAPLES, THREE ON EITHER SIDE OF THE CUT LINE. THE ECHELON FLEX 45 POWERED PLUSINSTRUMENTS HAVE A STAPLE LINE THAT IS APPROXIMATELY 45 MM LONG AND A CUT LINE THAT IS APPROXIMATELY 42 MM LONG. THE SHAFT CAN ROTATE FREELYIN BOTH DIRECTIONS AND AN ARTICULATION MECHANISM ENABLES THE DISTAL PORTION OF THE SHAFT TO PIVOT TO FACILITATE LATERAL ACCESS TO THE OPERATIVESITE.THE INSTRUMENTS ARE PACKAGED WITH A PRIMARY LITHIUM BATTERY PACK THAT MUST BE INSTALLED PRIOR TO USE. THERE ARE SPECIFIC REQUIREMENTS FORDISPOSING OF THE BATTERY PACK. REFER TO THE BATTERY PACK DISPOSAL SECTION.THE INSTRUMENTS ARE PACKAGED WITHOUT A RELOAD AND MUST BE LOADED PRIOR TO USE. A STAPLE RETAINING CAP ON THE RELOAD PROTECTS THE STAPLE LEGPOINTS DURING SHIPPING AND TRANSPORTATION. THE INSTRUMENTS’ LOCK-OUT FEATURE IS DESIGNED TO PREVENT A USED OR IMPROPERLY INSTALLED RELOADFROM BEING REFIRED OR AN INSTRUMENT FROM BEING FIRED WITHOUT A RELOAD.: Brand: ECHELON FLEX

## (undated) DEVICE — SUT MONOCRYL 4-0 PS-2 27 IN Y426H

## (undated) DEVICE — TROCAR: Brand: KII FIOS FIRST ENTRY

## (undated) DEVICE — TISSUE RETRIEVAL SYSTEM: Brand: INZII RETRIEVAL SYSTEM

## (undated) DEVICE — LIGAMAX 5 MM ENDOSCOPIC MULTIPLE CLIP APPLIER: Brand: LIGAMAX

## (undated) DEVICE — SYRINGE 10ML LL

## (undated) DEVICE — TROCAR: Brand: KII® SLEEVE

## (undated) DEVICE — TOWEL SURG XR DETECT GREEN STRL RFD

## (undated) DEVICE — DECANTER: Brand: UNBRANDED

## (undated) DEVICE — EXOFIN PRECISION PEN HIGH VISCOSITY TOPICAL SKIN ADHESIVE: Brand: EXOFIN PRECISION PEN, 1G

## (undated) DEVICE — INTENDED FOR TISSUE SEPARATION, AND OTHER PROCEDURES THAT REQUIRE A SHARP SURGICAL BLADE TO PUNCTURE OR CUT.: Brand: BARD-PARKER SAFETY BLADES SIZE 11, STERILE

## (undated) DEVICE — THE ECHELON, ECHELON ENDOPATH™ AND ECHELON FLEX™ FAMILIES OF ENDOSCOPIC LINEAR CUTTERS AND RELOADS ARE STERILE, SINGLE PATIENT USE INSTRUMENTS THAT SIMULTANEOUSLY CUT AND STAPLE TISSUE. THERE ARE SIX STAGGERED ROWS OF STAPLES, THREE ON EITHER SIDE OF THE CUT LINE. THE 45 MM INSTRUMENTS HAVE A STAPLE LINE THATIS APPROXIMATELY 45 MM LONG AND A CUT LINE THAT IS APPROXIMATELY 42 MM LONG. THE SHAFT CAN ROTATE FREELY IN BOTH DIRECTIONS AND AN ARTICULATION MECHANISM ON ARTICULATING INSTRUMENTS ENABLES BENDING THE DISTAL PORTIONOF THE SHAFT TO FACILITATE LATERAL ACCESS OF THE OPERATIVE SITE.THE INSTRUMENTS ARE SHIPPED WITHOUT A RELOAD AND MUST BE LOADED PRIOR TO USE. A STAPLE RETAINING CAP ON THE RELOAD PROTECTS THE STAPLE LEG POINTS DURING SHIPPING AND TRANSPORTATION. THE INSTRUMENTS’ LOCK-OUT FEATURE IS DESIGNED TO PREVENT A USED RELOAD FROM BEING REFIRED.: Brand: ECHELON ENDOPATH

## (undated) DEVICE — Device: Brand: OMNICLOSE TROCAR SITE CLOSURE DEVICE

## (undated) DEVICE — GLOVE INDICATOR UNDERGLOVE SZ 6 BLUE

## (undated) DEVICE — SUT VICRYL 0 UR-6 27 IN J603H

## (undated) DEVICE — PENCIL ELECTROSURG E-Z CLEAN -0035H

## (undated) DEVICE — PACK PBDS LAP CHOLE RF

## (undated) DEVICE — INSUFFLATION NEEDLE TO ESTABLISH PNEUMOPERITONEUM.: Brand: INSUFFLATION NEEDLE

## (undated) DEVICE — TRAY FOLEY 16FR URIMETER SURESTEP